# Patient Record
Sex: FEMALE | Race: WHITE | NOT HISPANIC OR LATINO | Employment: OTHER | URBAN - METROPOLITAN AREA
[De-identification: names, ages, dates, MRNs, and addresses within clinical notes are randomized per-mention and may not be internally consistent; named-entity substitution may affect disease eponyms.]

---

## 2017-01-31 ENCOUNTER — HOSPITAL ENCOUNTER (OUTPATIENT)
Dept: RADIOLOGY | Facility: HOSPITAL | Age: 52
Discharge: HOME | End: 2017-01-31
Attending: GENERAL PRACTICE
Payer: MEDICAID

## 2017-01-31 DIAGNOSIS — Z12.31 VISIT FOR SCREENING MAMMOGRAM: ICD-10-CM

## 2017-01-31 PROCEDURE — 77063 BREAST TOMOSYNTHESIS BI: CPT | Mod: 26,,, | Performed by: RADIOLOGY

## 2017-01-31 PROCEDURE — 77067 SCR MAMMO BI INCL CAD: CPT | Mod: 26,,, | Performed by: RADIOLOGY

## 2017-01-31 PROCEDURE — 77067 SCR MAMMO BI INCL CAD: CPT | Mod: TC

## 2018-01-29 DIAGNOSIS — Z12.31 VISIT FOR SCREENING MAMMOGRAM: Primary | ICD-10-CM

## 2018-08-20 DIAGNOSIS — Z12.39 SCREENING BREAST EXAMINATION: Primary | ICD-10-CM

## 2018-08-23 ENCOUNTER — HOSPITAL ENCOUNTER (OUTPATIENT)
Dept: RADIOLOGY | Facility: HOSPITAL | Age: 53
Discharge: HOME | End: 2018-08-23
Attending: NURSE PRACTITIONER
Payer: MEDICAID

## 2018-08-23 VITALS — BODY MASS INDEX: 38.65 KG/M2 | WEIGHT: 255 LBS | HEIGHT: 68 IN

## 2018-08-23 DIAGNOSIS — Z12.39 SCREENING BREAST EXAMINATION: ICD-10-CM

## 2018-08-23 PROCEDURE — 77067 SCR MAMMO BI INCL CAD: CPT | Mod: 26,,, | Performed by: RADIOLOGY

## 2018-08-23 PROCEDURE — 77063 BREAST TOMOSYNTHESIS BI: CPT | Mod: 26,,, | Performed by: RADIOLOGY

## 2018-08-23 PROCEDURE — 77067 SCR MAMMO BI INCL CAD: CPT | Mod: TC

## 2018-09-12 DIAGNOSIS — M25.572 ACUTE LEFT ANKLE PAIN: Primary | ICD-10-CM

## 2018-09-12 DIAGNOSIS — S93.422A SPRAIN OF DELTOID LIGAMENT OF LEFT ANKLE, INITIAL ENCOUNTER: ICD-10-CM

## 2018-09-12 DIAGNOSIS — S93.412A SPRAIN OF CALCANEOFIBULAR LIGAMENT OF LEFT ANKLE, INITIAL ENCOUNTER: ICD-10-CM

## 2018-09-28 ENCOUNTER — CLINICAL SUPPORT (OUTPATIENT)
Dept: REHABILITATION | Facility: HOSPITAL | Age: 53
End: 2018-09-28
Attending: ORTHOPAEDIC SURGERY
Payer: MEDICAID

## 2018-09-28 DIAGNOSIS — R53.1 DECREASED STRENGTH: ICD-10-CM

## 2018-09-28 DIAGNOSIS — M25.572 CHRONIC PAIN OF LEFT ANKLE: ICD-10-CM

## 2018-09-28 DIAGNOSIS — G89.29 CHRONIC PAIN OF LEFT ANKLE: ICD-10-CM

## 2018-09-28 DIAGNOSIS — M25.673 DECREASED ROM OF ANKLE: ICD-10-CM

## 2018-09-28 PROCEDURE — 97161 PT EVAL LOW COMPLEX 20 MIN: CPT | Mod: PN

## 2018-09-28 PROCEDURE — 97110 THERAPEUTIC EXERCISES: CPT | Mod: PN

## 2018-09-28 NOTE — PROGRESS NOTES
See full Physical Therapy Evaluation in POC    Precautions: Previous injury to left ankle    Evaluation Date: 9/28/2018  Visit # authorized: 20  Authorization period: 12/31/2018    TREATMENT:  Eduarda MCCULLOUGH received therapeutic exercises to develop strength and endurance, flexibility for 15 minutes including:   Towel curls 2 minutes  Marbles 2 x (eversion bias)  Seated calf stretch 3 times 20 seconds  Ankle AROM EV/IN   DF c yellow TB    Pt. Received cold pack x 00 min. To left ankle. Following treatment.    Prognosis: Excellent    Medical necessity is demonstrated by the following IMPAIRMENTS/PROBLEM LIST:     1) Increase in pain level limiting function   2) Decreased ROM of left ankle   3) Decreased strength   4) Decreased functional ability    5)Lack of HEP    Anticipated Barriers for physical therapy: none    GOALS: Short Term Goals:  6 weeks  1. Patient will report decreased in left ankle pain  <   / =  2  /10  to increase tolerance for daily activities.   2. Patient will be able to report an 40% improvement in ability to engage in hobbies.   3. Patient will be able to demonstrate an increased MMT for plantar flexion 3+/5  to increase tolerance for ADL and work activities.  4. Patient will be able to tolerate HEP to improve ROM and independence with ADL's    Long Term Goals: 12 weeks  1. Patient will report decreased in left ankle pain  <   / =  0 /10  to increase tolerance for daily activities.   2. Patient will be able to report an 40% improvement in ability to engage in hobbies.   3. Patient will be able to demonstrate an increased MMT for plantar flexion 3+/5  to increase tolerance for ADL and work activities.  4. Patient will be able to report at CJ level (20-40% impaired) on Modified FIM score for mobility to demonstrate increase in LE function and mobility in home and community environment.   5. Patient will be able to demonstrate an increase of 10 degrees of overall left ankle motion to improve mobility  and function.  5. Patient will be able to demonstrate independence with HEP to improve ROM and independence with ADL's      Plan     Cont PT 1-3 times a week for 12 weeks during the certification period (9/28/2018 - 12/28/2018) PN Due: (10/28/2018)

## 2018-09-28 NOTE — PLAN OF CARE
Physical Therapy Evaluation    Name: Eduarda Casanova  Clinic Number: [id number]    Diagnosis:   Encounter Diagnoses   Name Primary?    Decreased ROM of ankle     Decreased strength     Chronic pain of left ankle      Physician: Blanca Kennedy MD  Treatment Orders: PT Eval and Treat    No past medical history on file.  No current outpatient medications on file.     No current facility-administered medications for this visit.      Review of patient's allergies indicates:  Allergies not on file  Precautions: Previous injury to left ankle    Evaluation Date: 9/28/2018  Visit # authorized: 20  Authorization period: 12/31/2018      Subjective     Eduarda MCCULLOUGH is a 53 y.o. female that presents to [facility] outpatient clinic secondary to left ankle pain. Leg fell asleep and she feel over. It has been about 4 months now. Moving ankle inside hurts. Took a while for her to finally see the doctor and a while to have a therapy session scheduled. Patient indicates there is a screw placed on her left ankle and torn ligament. Patient indicates that when she was in her 20s she and trauma to her ankle and broke it.     Patient c/o: continuous  Radicular symptoms: Present on the left side   Onset:: insidious/sudden/gradual   Pain Scale: Rates pain on a scale of 0-10 to be 3 at worst; 1 currently; 0 at best .  Aggravating factors: Unless she steps wrong, then it can be a spike   Relieving factors: Support sock   Previous treatment: Ankle therapy following initial injury   Imaging: none present in chart for current episode   Past surgical history: Fracture of left ankle 90s, RTC repair, two sinus surgeries, wisdom teeth removal   Functional deficits: Walking fast, trying to go upstairs, doing anything without being gentle with my ankle, can't fool with her boat currently, stairs at [employer] bother her, caring for her mother in  getting her up on a step is difficult     Prior level of function: IND prior to four months ago    Occupation: Drive   Environment: 1 story home with 1 steps to enter, I am aware that when I step down I keep ankle stiff and compensate with other leg   No cultural or spiritual barriers identified to treatment or learning.  Patient's goals: I am just trying not to lose mobility       Objective     Observation: No gait deviations noted, presents with fallen arch without the presence of navicular drop bilaterally, lateral joint swelling present        Ankle Range of Motion:   Ankle Right Left   Dorsiflexion 20 -6/0   Plantarflexion 60 45   Inversion 50 30   Eversion 20 0      Strength:    Right Ankle   Left Ankle   Dorsiflexion: 5/5 Dorsiflexion: 5/5   Plantarflexion:  5/5 Plantarflexion: 4-/5   Inversion: 5/5 Inversion: 4-/5   Eversion: 5/5 Eversion: 4-/5        Right LE  Left LE   Hip flexion: 5/5 Hip flexion: 5/5   Hip extension:  5/5 Hip extension: 4+/5   Hip abduction: 5/5 Hip abduction: 5/5   Hip adduction: 5/5 Hip adduction 5/5   Knee extension: 5/5 Knee extension: 5/5   Knee flexion: 5/5 Knee flexion: 5/5     Special Tests:   Right Left   Posterior Drawer Test Negative Negative   Anterior Drawer Test Negative Negative    Squeeze test Negative  Negative   Parks test Negative Negative    Windlass test Negative Negative        Gait: No deviation in gait pattern     Joint Mobility: Decreased mobility of left ankle, previous trauma     Palpation: Sensitivity present lateral aspect of ankle inferior to malleolus     Flexibility: Patient presents with limitations present     Right ankle: 51 cm   Right ankle: 51 cm     Functional Limitations Reports - G Codes  Category: Mobility   Intake 46% Current Status CK -   Predicted 37% Goal Status+ CJ -      PT Evaluation Completed? Yes  Discussed Plan of Care with patient: Yes    TREATMENT:  Eduarda MCCULLOUGH received therapeutic exercises to develop strength and endurance, flexibility for 15 minutes including:     Pt. Received cold pack x 00 min. To left ankle.  Following treatment.    HEP provided: See patient instructions   Instructed patient regarding: Proper technique with all exercises. Pt demo good understanding of the education provided. Eduarda MCCULLOUGH demonstrated good return demonstration of activities.        Assessment     This is a 53 y.o. female referred to outpatient physical therapy and presents with a medical diagnosis of Acute left ankle pain, Sprain of calcaneofibular ligament of left ankle, initial encounter, Sprain of deltoid ligament of left ankle, initial encounter and demonstrates limitations as described in the problem list. Patient will benefit from physcial therapy services in order to maximize pain free functional independence. The following goals were discussed with the patient and patient is in agreement with them as to be addressed in the treatment plan. Patient was given a HEP consisting of exercises listed above. Patient verbally understood the instructions as they were given and demonstrated proper form and technique during therapy. Patient was advised to perform these exercises free of pain, and to stop performing them if pain occurs. Patient would benefit from skilled PT to address above stated problems, as well as, achieve pt goals within a timely manner. Patient has set realistic goals and has verbalized good understanding and agreement with reported diagnosis, prognosis and treatment. Patient demonstrates no additional cultural, spiritual or educational need and currently has no barriers to learning.      History  Co-morbidities and personal factors that may impact the plan of care Examination  Body Structures and Functions, activity limitations and participation restrictions that may impact the plan of care Clinical Presentation   Decision Making/ Complexity Score   Co-morbidities:   Past trauma     Personal Factors:   Age 53  Occupation:   Lifestyle: Sedentary   Attitudes: Pleasant    Body Regions: LE    Body Systems:  Musculoskeletal (symmetry, ROM, strength, flexibility, joint mobility), Neuromuscular (coordination, posture, balance, gait, transfers, motor control/learning), Cardiovascular (endurance)    Activity limitations: Decreased ability to engage in standing activities     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 Patient will report decreased in left ankle pain  <   / =  2  /10  to increase tolerance for daily activities.   2. Patient will be able to report an 40% improvement in ability to engage in hobbies.   3. Patient will be able to demonstrate an increased MMT for plantar flexion 3+/5  to increase tolerance for ADL and work activities.  4. Patient will be able to tolerate HEP to improve ROM and independence with ADL's    Long Term Goals: 12 weeks  1. Patient will report decreased in left ankle pain  <   / =  0 /10  to increase tolerance for daily activities.   2. Patient will be able to report an 40% improvement in ability to engage in hobbies.   3. Patient will be able to demonstrate an increased MMT for plantar flexion 3+/5  to increase tolerance for ADL and work activities.  4. Patient will be able to report at CJ level (20-40% impaired) on Modified FIM score for mobility to demonstrate increase in LE function and mobility in home and community environment.   5. Patient will be able to demonstrate an increase of 10 degrees of overall left ankle motion to improve  mobility and function.  5. Patient will be able to demonstrate independence with HEP to improve ROM and independence with ADL's      Plan     Patient will be treated by physical therapy 1-3 times a week for 12 weeks for Electrical Stimulation PRN, Iontophoresis (with dexamethasone PRN), Manual Therapy, Moist Heat/ Ice, Neuromuscular Re-ed, Patient Education, Therapeutic Activites, Therapeutic Exercise and Other therapeutic taping, dry needling, aquatic therapy to achieve established goals. Eduarda MCCULLOUGH may at times be seen by a PTA as part of the Rehab Team.      Cont PT 1-3 times a week for 12 weeks during the certification period (9/28/2018 - 12/28/2018) PN Due: (10/28/2018)       I certify the need for these services furnished under this plan of treatment and while under my care.______________________________ Physician/Referring Practitioner  Date of Signature      Elena Zarate, PT  9/28/2018

## 2018-10-04 ENCOUNTER — CLINICAL SUPPORT (OUTPATIENT)
Dept: REHABILITATION | Facility: HOSPITAL | Age: 53
End: 2018-10-04
Attending: ORTHOPAEDIC SURGERY
Payer: MEDICAID

## 2018-10-04 DIAGNOSIS — G89.29 CHRONIC PAIN OF LEFT ANKLE: ICD-10-CM

## 2018-10-04 DIAGNOSIS — R53.1 DECREASED STRENGTH: ICD-10-CM

## 2018-10-04 DIAGNOSIS — M25.672 DECREASED RANGE OF MOTION OF LEFT ANKLE: ICD-10-CM

## 2018-10-04 DIAGNOSIS — M25.572 CHRONIC PAIN OF LEFT ANKLE: ICD-10-CM

## 2018-10-04 PROCEDURE — 97140 MANUAL THERAPY 1/> REGIONS: CPT | Mod: PN

## 2018-10-04 PROCEDURE — 97110 THERAPEUTIC EXERCISES: CPT | Mod: PN

## 2018-10-04 NOTE — PROGRESS NOTES
"  Physical Therapy Daily Treatment Note     Name: Eduarda Casanova  Clinic Number: [id number]    Therapy Diagnosis:   Encounter Diagnoses   Name Primary?    Decreased range of motion of left ankle     Decreased strength     Chronic pain of left ankle      Physician: Blanca Kennedy MD    Visit Date: 10/4/2018  Physician Orders: Evaluate and Treat  Medical Diagnosis: sprain of calcaneofibular ligament left angle; sprain of deltoid ligament left ankle   Evaluation Date: 9/28/18  Authorization Period Expiration: 12/31/18  Visit #/Visits authorized: 2/20     Time In: 10:00AM  Time Out: 10:58AM  Total Billable Time: 58 minutes    Precautions: Standard    Subjective     Pt reports: Patient reports that she is stretching at home as prescribed by therapist to "the severity I can tolerate". Pt reports "soreness" that remains afterwards. Patient states that she feels like sometimes tomas she changes directions she feels like her ankle may "buckle:     She was compliant with home exercise program.  Response to previous treatment: good   Functional change: minimal     Pain: 2/10  Location: left ankle    Objective     Maria Esther received therapeutic exercises to develop ROM and flexibility for 15 minutes including:   - gastroc stretch with strap 30" holds x 4 reps    - contract/relax 10" hold x 10 reps each into PF/DF   - assisted stretching 20" hold x 4 reps each INV/EVR   - gastroc/solues stretch on wedge     Maria Esther received the following manual therapy techniques: Joint mobilizations were applied to the the left ankle for 20 minutes, including   - AP mobilization grade III to improve motion    Tissue mobilization performed for 20 minutes     -STM/MFR and graston applied to tissues in posterior ankle for improved DF/PF    Maria Esther participated in neuromuscular re-education activities to improve:  for  minutes. The following activities were included:      Maria Esther participated in dynamic functional therapeutic activities to improve " "functional performance for   minutes, including:      Maria Esther participated in gait training to improve functional mobility and safety for   minutes, including:      Maria Esther received cold pack for 10 minutes to to decrease circulation, pain, and swelling post treatment           Home Exercises Provided and Patient Education Provided     Education provided:   - stretching, and DOMS     Written Home Exercises Provided: Patient instructed to cont prior HEP.  Exercises were reviewed and Maria Esther was able to demonstrate them prior to the end of the session.  Maria Esther demonstrated good  understanding of the education provided.       Assessment     Patient treatment focused on improvement on joint mobility and tissue mobility in joint and ankle. Patient tolerated well with reports of feeling "improved" motion post treatment.     Maria Esther is progressing well towards her goals.   Pt prognosis is Good.     Pt will continue to benefit from skilled outpatient physical therapy to address the deficits listed in the problem list box on initial evaluation, provide pt/family education and to maximize pt's level of independence in the home and community environment.     Pt's spiritual, cultural and educational needs considered and pt agreeable to plan of care and goals.    Anticipated barriers to physical therapy: none       Plan     Patient will benefit from continuation of manual therapy techniques to improve joint mobility and tissue mobility in posterior chain of left limb for improved mobility patterns. Patient will benefit from progression to strengthening program when tolerated.     Charles Mckoy, PT    "

## 2018-10-09 ENCOUNTER — CLINICAL SUPPORT (OUTPATIENT)
Dept: REHABILITATION | Facility: HOSPITAL | Age: 53
End: 2018-10-09
Attending: ORTHOPAEDIC SURGERY
Payer: MEDICAID

## 2018-10-09 DIAGNOSIS — M25.672 DECREASED RANGE OF MOTION OF LEFT ANKLE: ICD-10-CM

## 2018-10-09 DIAGNOSIS — M25.572 CHRONIC PAIN OF LEFT ANKLE: ICD-10-CM

## 2018-10-09 DIAGNOSIS — R53.1 DECREASED STRENGTH: ICD-10-CM

## 2018-10-09 DIAGNOSIS — G89.29 CHRONIC PAIN OF LEFT ANKLE: ICD-10-CM

## 2018-10-09 PROCEDURE — 97140 MANUAL THERAPY 1/> REGIONS: CPT | Mod: PN

## 2018-10-09 PROCEDURE — 97110 THERAPEUTIC EXERCISES: CPT | Mod: PN

## 2018-10-10 NOTE — PROGRESS NOTES
"  Physical Therapy Daily Treatment Note     Name: Eduarda Casanova  Clinic Number: [id number]    Therapy Diagnosis:   Encounter Diagnoses   Name Primary?    Decreased range of motion of left ankle     Decreased strength     Chronic pain of left ankle      Physician: Blanca Kennedy MD    Visit Date: 10/9/2018  Physician Orders: Evaluate and Treat  Medical Diagnosis: sprain of calcaneofibular ligament left angle; sprain of deltoid ligament left ankle   Evaluation Date: 9/28/18  Authorization Period Expiration: 12/31/18  Visit #/Visits authorized: 2/20     Time In: 11:00AM  Time Out: 11:55AM  Total Billable Time: 55 minutes    Precautions: Standard    Subjective     Pt reports: Patient reports that she is stretching at home as prescribed and feels like she is gaining motion. Patient reports slight "soreness" today but she relates it to soreness.     She was compliant with home exercise program.  Response to previous treatment: good   Functional change: minimal     Pain: 4/10  Location: left ankle    Objective     Maria Esther received therapeutic exercises to develop ROM and flexibility for 15 minutes including:   - gastroc stretch with strap 30" holds x 4 reps    - contract/relax 10" hold x 10 reps each into PF/DF   - assisted stretching 20" hold x 4 reps each INV/EVR   - gastroc/solues stretch on wedge    -  resisted (manually) with progressing resistance ankle 4 way, 20 reps in each direction     Maria Esther received the following manual therapy techniques: Joint mobilizations were applied to the the left ankle for 20 minutes, including   - AP mobilization grade III to improve motion    Tissue mobilization performed for 20 minutes     -STM/MFR and graston applied to tissues in posterior ankle for improved DF/PF  -mobilization on wedge, AP, grade III  -graston to ant tib with manual therapy to gastroc     Maria Esther participated in neuromuscular re-education activities to improve:  for  minutes. The following activities were " "included:      Maria Esther participated in dynamic functional therapeutic activities to improve functional performance for   minutes, including:      Maria Esther participated in gait training to improve functional mobility and safety for   minutes, including:      Maria Esther received cold pack for 10 minutes to to decrease circulation, pain, and swelling post treatment           Home Exercises Provided and Patient Education Provided     Education provided:   - stretching, and DOMS     Written Home Exercises Provided: Patient instructed to cont prior HEP.  Exercises were reviewed and Maria Esther was able to demonstrate them prior to the end of the session.  Maria Esther demonstrated good  understanding of the education provided. CP applied post treatment to moderate DOMS potential.       Assessment     Patient treatment focused on improvement on joint mobility and tissue mobility in joint and ankle. Patient tolerated well with reports of feeling "improved" motion post treatment.     Maria Esther is progressing well towards her goals.   Pt prognosis is Good.     Pt will continue to benefit from skilled outpatient physical therapy to address the deficits listed in the problem list box on initial evaluation, provide pt/family education and to maximize pt's level of independence in the home and community environment.     Pt's spiritual, cultural and educational needs considered and pt agreeable to plan of care and goals.    Anticipated barriers to physical therapy: none       Plan     Patient will benefit from continuation of manual therapy techniques to improve joint mobility and tissue mobility in posterior chain of left limb for improved mobility patterns. Patient will benefit from progression to strengthening program when tolerated. Consider cupping. Increased ankle pain noted with soleus stretch - deferred to 50% stretch to avoid ant ankle pain.     Charles Mckoy, PT  "

## 2018-10-11 ENCOUNTER — CLINICAL SUPPORT (OUTPATIENT)
Dept: REHABILITATION | Facility: HOSPITAL | Age: 53
End: 2018-10-11
Attending: ORTHOPAEDIC SURGERY
Payer: MEDICAID

## 2018-10-11 DIAGNOSIS — R53.1 DECREASED STRENGTH: ICD-10-CM

## 2018-10-11 DIAGNOSIS — M25.672 DECREASED RANGE OF MOTION OF LEFT ANKLE: ICD-10-CM

## 2018-10-11 DIAGNOSIS — M25.572 CHRONIC PAIN OF LEFT ANKLE: ICD-10-CM

## 2018-10-11 DIAGNOSIS — G89.29 CHRONIC PAIN OF LEFT ANKLE: ICD-10-CM

## 2018-10-11 PROCEDURE — 97110 THERAPEUTIC EXERCISES: CPT | Mod: PN

## 2018-10-11 PROCEDURE — 97140 MANUAL THERAPY 1/> REGIONS: CPT | Mod: PN

## 2018-10-11 NOTE — PROGRESS NOTES
"  Physical Therapy Daily Treatment Note     Name: Eduarda Casanova  Clinic Number: [id number]    Therapy Diagnosis:   Encounter Diagnoses   Name Primary?    Decreased range of motion of left ankle     Decreased strength     Chronic pain of left ankle      Physician: Blanca Kennedy MD    Visit Date: 10/11/2018  Physician Orders: Evaluate and Treat  Medical Diagnosis: sprain of calcaneofibular ligament left angle; sprain of deltoid ligament left ankle   Evaluation Date: 9/28/18  Authorization Period Expiration: 12/31/18  Visit #/Visits authorized: 2/20     Time In: 09:55AM  Time Out: 11:00AM  Total Billable Time: 55 minutes    Precautions: Standard    Subjective     Pt reports: Patient reports that she is stretching at home as prescribed and feels like she is gaining motion. Patient reports slight "soreness" today but she relates it to soreness.     She was compliant with home exercise program.  Response to previous treatment: good   Functional change: minimal     Pain: 4/10  Location: left ankle    Objective     Maria Esther received therapeutic exercises to develop ROM and flexibility for 15 minutes including:   - gastroc stretch with strap 30" holds x 4 reps    - contract/relax 10" hold x 10 reps each into PF/DF   - assisted stretching 20" hold x 4 reps each INV/EVR   - gastroc/solues stretch on wedge    - resisted (manually) with progressing resistance ankle 4 way, 20 reps in each direction   + upright bike 8'     Maria Esther received the following manual therapy techniques: Joint mobilizations were applied to the the left ankle for 20 minutes, including   - AP mobilization grade III to improve motion    Tissue mobilization performed for 20 minutes     -STM/MFR and graston applied to tissues in posterior ankle for improved DF/PF  -mobilization on wedge, AP, grade III  -graston to ant tib with manual therapy to gastroc; ant ankle above lateral malleoli   -KT applied to pull ankle laterally    Maria Esther participated in " "neuromuscular re-education activities to improve:  for  minutes. The following activities were included:      Maria Esther participated in dynamic functional therapeutic activities to improve functional performance for   minutes, including:      Maria Esther participated in gait training to improve functional mobility and safety for   minutes, including:      Maria Esther received cold pack for 10 minutes to to decrease circulation, pain, and swelling post treatment           Home Exercises Provided and Patient Education Provided     Education provided:   - stretching, and DOMS     Written Home Exercises Provided: Patient instructed to cont prior HEP.  Exercises were reviewed and Maria Esther was able to demonstrate them prior to the end of the session.  Maria Esther demonstrated good  understanding of the education provided. CP applied post treatment to moderate DOMS potential.       Assessment     Patient treatment focused on improvement on joint mobility and tissue mobility in joint and ankle. Patient tolerated well with reports of feeling "improved" motion post treatment. KT applied - patient instructed on how to remove and when to remove with allergy. Pt verbalized understanding     Maria Esther is progressing well towards her goals.   Pt prognosis is Good.     Pt will continue to benefit from skilled outpatient physical therapy to address the deficits listed in the problem list box on initial evaluation, provide pt/family education and to maximize pt's level of independence in the home and community environment.     Pt's spiritual, cultural and educational needs considered and pt agreeable to plan of care and goals.    Anticipated barriers to physical therapy: none       Plan     Patient will benefit from continuation of manual therapy techniques to improve joint mobility and tissue mobility in posterior chain of left limb for improved mobility patterns. Patient will benefit from progression to strengthening program when tolerated. Consider cupping. " Increased ankle pain noted with soleus stretch - deferred to 50% stretch to avoid ant ankle pain.   Baps board at next visit    Charles Mckoy, PT

## 2018-10-22 NOTE — PROGRESS NOTES
"  Physical Therapy Daily Treatment Note     Name: Eduarda Casanova  Clinic Number: [id number]    Therapy Diagnosis:   Encounter Diagnoses   Name Primary?    Decreased range of motion of left ankle     Decreased strength     Chronic pain of left ankle      Physician: Blanca Kennedy MD    Visit Date: 10/23/2018  Physician Orders: Evaluate and Treat  Medical Diagnosis: sprain of calcaneofibular ligament left angle; sprain of deltoid ligament left ankle   Evaluation Date: 9/28/18  Authorization Period Expiration: 12/31/18  Visit #/Visits authorized: 5/20     Time In: 0900   Time Out: 1000  Total Billable Time: 60 minutes    Precautions: Standard    Subjective     Pt reports: the side of her ankle is bothering her right now. Patient states that she does not usually have this pain present but it usually occurs on the other side. Patient says that she really is hurting a little more today.    She was compliant with home exercise program.  Response to previous treatment: good   Functional change: minimal     Pain: 4/10  Location: left ankle    Objective     Maria Esther received therapeutic exercises to develop ROM and flexibility for 30 minutes including:   - gastroc stretch with strap 30" holds x 4 reps    - contract/relax 10" hold x 10 reps each into PF/DF   - assisted stretching 20" hold x 4 reps each INV/EVR   - gastroc/solues stretch on wedge    - resisted (manually) with progressing resistance ankle 4 way, 20 reps in each direction   upright bike 8'   +SL Eversion 1# 30 reps   +SL inversion 1# 30 reps   +1 leg balance on incline 2 times 30 seconds  +BAPs 20 reps CW/CCW     Maria Esther received the following manual therapy techniques: Joint mobilizations were applied to the the left ankle for 20 minutes, including   - AP mobilization grade III to improve motion    Tissue mobilization performed for 20 minutes     -STM/MFR and graston applied to tissues in posterior ankle for improved DF/PF  -mobilization on wedge, AP, grade " III  -manual to ant tib with manual therapy to gastroc; ant ankle above lateral malleoli   -KT applied to pull ankle laterally    Maria Esther participated in neuromuscular re-education activities to improve:  for  minutes. The following activities were included:        Maria Esther received cold pack for 10 minutes to to decrease circulation, pain, and swelling post treatment           Home Exercises Provided and Patient Education Provided     Education provided:   - stretching, and DOMS     Written Home Exercises Provided: Patient instructed to cont prior HEP.  Exercises were reviewed and Maria Esther was able to demonstrate them prior to the end of the session.  Maria Esther demonstrated good  understanding of the education provided. CP applied post treatment to moderate DOMS potential.       Assessment     Patient tolerated treatment well today. Patient was able to engage in balance exercises during today's treatment to improve proprioception and standing tolerance. Patient was able to engage in resistive AROM of the ankle against gravity. Patient had proper response to therapeutic exercises performed in clinic today. Patient presents with muscular restrictions present in calf muscle evident with manual therapy.     Maria Esther is progressing well towards her goals.   Pt prognosis is Good.     Pt will continue to benefit from skilled outpatient physical therapy to address the deficits listed in the problem list box on initial evaluation, provide pt/family education and to maximize pt's level of independence in the home and community environment.     Pt's spiritual, cultural and educational needs considered and pt agreeable to plan of care and goals.    Anticipated barriers to physical therapy: none       Plan     Patient will benefit from continuation of manual therapy techniques to improve joint mobility and tissue mobility in posterior chain of left limb for improved mobility patterns. Patient will benefit from progression to strengthening  program when tolerated. Consider cupping. Increased ankle pain noted with soleus stretch - deferred to 50% stretch to avoid ant ankle pain.   Baps board at next visit    Elena Zarate, PT

## 2018-10-23 ENCOUNTER — CLINICAL SUPPORT (OUTPATIENT)
Dept: REHABILITATION | Facility: HOSPITAL | Age: 53
End: 2018-10-23
Attending: ORTHOPAEDIC SURGERY
Payer: MEDICAID

## 2018-10-23 DIAGNOSIS — R53.1 DECREASED STRENGTH: ICD-10-CM

## 2018-10-23 DIAGNOSIS — M25.572 CHRONIC PAIN OF LEFT ANKLE: ICD-10-CM

## 2018-10-23 DIAGNOSIS — M25.672 DECREASED RANGE OF MOTION OF LEFT ANKLE: ICD-10-CM

## 2018-10-23 DIAGNOSIS — G89.29 CHRONIC PAIN OF LEFT ANKLE: ICD-10-CM

## 2018-10-23 PROCEDURE — 97110 THERAPEUTIC EXERCISES: CPT | Mod: PN

## 2018-10-30 ENCOUNTER — CLINICAL SUPPORT (OUTPATIENT)
Dept: REHABILITATION | Facility: HOSPITAL | Age: 53
End: 2018-10-30
Attending: ORTHOPAEDIC SURGERY
Payer: MEDICAID

## 2018-10-30 DIAGNOSIS — R53.1 DECREASED STRENGTH: ICD-10-CM

## 2018-10-30 DIAGNOSIS — G89.29 CHRONIC PAIN OF LEFT ANKLE: ICD-10-CM

## 2018-10-30 DIAGNOSIS — M25.572 CHRONIC PAIN OF LEFT ANKLE: ICD-10-CM

## 2018-10-30 DIAGNOSIS — M25.672 DECREASED RANGE OF MOTION OF LEFT ANKLE: ICD-10-CM

## 2018-10-30 PROCEDURE — 97110 THERAPEUTIC EXERCISES: CPT | Mod: PN

## 2018-10-30 NOTE — PROGRESS NOTES
"  Physical Therapy Daily Treatment Note     Name: Eduarda Casanova  Clinic Number: [id number]    Therapy Diagnosis:   Encounter Diagnoses   Name Primary?    Decreased range of motion of left ankle     Decreased strength     Chronic pain of left ankle      Physician: Blanca Kennedy MD    Visit Date: 10/30/2018  Physician Orders: Evaluate and Treat  Medical Diagnosis: sprain of calcaneofibular ligament left angle; sprain of deltoid ligament left ankle   Evaluation Date: 9/28/18  Authorization Period Expiration: 12/31/18  Visit #/Visits authorized: 5/20     Time In: 0900   Time Out: 1000  Total Billable Time: 60 minutes    Precautions: Standard    Subjective     Pt reports: the right side of the ankle is bothering today.     She was compliant with home exercise program.  Response to previous treatment: good   Functional change: minimal     Pain: 4/10  Location: left ankle    Objective     Maria Esther received therapeutic exercises to develop ROM and flexibility for 30 minutes including:   - gastroc stretch with strap 30" holds x 4 reps    - contract/relax 10" hold x 10 reps each into PF/DF   - assisted stretching 20" hold x 4 reps each INV/EVR   - gastroc/solues stretch on wedge 3 x 30 sec   - resisted (manually) with progressing resistance ankle 4 way, 20 reps in each direction   upright bike 8'   SL Eversion 1# 30 reps   SL inversion 1# 30 reps   1 leg balance on incline 2 times 30 seconds  +Seated BAPs 20 reps CW/CCW   +Shuttle 2 leg press 4 bands 3 x 12 reps  +Shuttle 1 leg press 2 bands 3 x 10 reps   +Shuttle calf raises 2 leg 2 x 10 reps   Seated calf raises 3 x 20 reps    Posterior tib calf raises 20 reps     Maria Esther received the following manual therapy techniques: Joint mobilizations were applied to the the left ankle for 10 minutes, including   - AP mobilization grade III to improve motion    Tissue mobilization performed for 20 minutes     -STM/MFR and graston applied to tissues in posterior ankle for improved " DF/PF  -mobilization on wedge, AP, grade III  -manual to ant tib with manual therapy to gastroc; ant ankle above lateral malleoli   -KT applied to pull ankle laterally    Maria Esther participated in neuromuscular re-education activities to improve:  for  minutes. The following activities were included:    Maria Esther received cold pack for 10 minutes to to decrease circulation, pain, and swelling post treatment       Home Exercises Provided and Patient Education Provided     Education provided:   - stretching, and DOMS     Written Home Exercises Provided: Patient instructed to cont prior HEP.  Exercises were reviewed and Maria Eshter was able to demonstrate them prior to the end of the session.  Maria Esther demonstrated good  understanding of the education provided. CP applied post treatment to moderate DOMS potential.       Assessment     Patient tolerated treatment well today. Patient engaged in activities seated today to reduce presence of discomforts present in right ankle. Patient did well with manual care. Patient presents with muscular restrictions present in calf muscle evident with manual therapy.     Maria Esther is progressing well towards her goals.   Pt prognosis is Good.     Pt will continue to benefit from skilled outpatient physical therapy to address the deficits listed in the problem list box on initial evaluation, provide pt/family education and to maximize pt's level of independence in the home and community environment.     Pt's spiritual, cultural and educational needs considered and pt agreeable to plan of care and goals.    Anticipated barriers to physical therapy: none       Plan     Patient will benefit from continuation of manual therapy techniques to improve joint mobility and tissue mobility in posterior chain of left limb for improved mobility patterns. Patient will benefit from progression to strengthening program when tolerated. Consider cupping. Increased ankle pain noted with soleus stretch - deferred to 50%  stretch to avoid ant ankle pain.   Baps board at next visit    Elena Zarate, PT

## 2018-11-01 ENCOUNTER — CLINICAL SUPPORT (OUTPATIENT)
Dept: REHABILITATION | Facility: HOSPITAL | Age: 53
End: 2018-11-01
Attending: ORTHOPAEDIC SURGERY
Payer: MEDICAID

## 2018-11-01 DIAGNOSIS — R53.1 DECREASED STRENGTH: ICD-10-CM

## 2018-11-01 DIAGNOSIS — S93.422A SPRAIN OF DELTOID LIGAMENT OF LEFT ANKLE, INITIAL ENCOUNTER: ICD-10-CM

## 2018-11-01 DIAGNOSIS — M25.572 CHRONIC PAIN OF LEFT ANKLE: ICD-10-CM

## 2018-11-01 DIAGNOSIS — G89.29 CHRONIC PAIN OF LEFT ANKLE: ICD-10-CM

## 2018-11-01 DIAGNOSIS — S93.412A SPRAIN OF CALCANEOFIBULAR LIGAMENT OF LEFT ANKLE, INITIAL ENCOUNTER: ICD-10-CM

## 2018-11-01 DIAGNOSIS — M25.672 DECREASED RANGE OF MOTION OF LEFT ANKLE: Primary | ICD-10-CM

## 2018-11-01 DIAGNOSIS — M25.572 ACUTE LEFT ANKLE PAIN: ICD-10-CM

## 2018-11-01 PROCEDURE — 97110 THERAPEUTIC EXERCISES: CPT | Mod: PN

## 2018-11-01 NOTE — PROGRESS NOTES
"  Physical Therapy Daily Treatment Note     Name: Eduarda Casanova  Clinic Number: [id number]    Therapy Diagnosis:   Encounter Diagnoses   Name Primary?    Decreased range of motion of left ankle Yes    Decreased strength     Chronic pain of left ankle     Acute left ankle pain     Sprain of calcaneofibular ligament of left ankle, initial encounter     Sprain of deltoid ligament of left ankle, initial encounter      Physician: Blanca Kennedy MD    Visit Date: 11/1/2018  Physician Orders: Evaluate and Treat  Medical Diagnosis: sprain of calcaneofibular ligament left angle; sprain of deltoid ligament left ankle   Evaluation Date: 9/28/18  Authorization Period Expiration: 12/31/18  Visit #/Visits authorized: 5/20     Time In: 0915   Time Out:   Total Billable Time: 60 minutes    Precautions: Standard    Subjective     Pt reports: the right side of of the left ankle and the joint is giving her pain today.  Pt states that she is also feeling the back of her ankle today.  Pt states that she felt it on Tuesday then it went away and came back.     She was compliant with home exercise program.  Response to previous treatment: good   Functional change: minimal     Pain: 4/10  Location: left ankle    Objective     Maria Esther received therapeutic exercises to develop ROM and flexibility for 40 minutes including:     - gastroc stretch with strap 30" holds x 4 reps    - contract/relax 10" hold x 10 reps each into PF/DF   - assisted stretching 20" hold x 4 reps each INV/EVR   - gastroc/solues stretch on wedge 3 x 30 sec   - resisted (manually) with progressing resistance ankle 4 way, 20 reps in each direction   upright bike 8'   SL Eversion 1# 30 reps   SL inversion 1# 30 reps   1 leg balance on incline 2 times 30 seconds  +Seated BAPs 20 reps CW/CCW   +Shuttle 2 leg press 4 bands 3 x 12 reps  +Shuttle 1 leg press 2 bands 3 x 10 reps   +Shuttle calf raises 2 leg 2 x 10 reps   Seated calf raises 3 x 20 reps    Posterior tib " calf raises 20 reps     Maria Esther received the following manual therapy techniques: Joint mobilizations were applied to the the left ankle for 10 minutes, including   - AP mobilization grade III to improve motion    -STM/MFR and graston applied to tissues in posterior ankle for improved DF/PF  -mobilization on wedge, AP, grade III  -manual to ant tib with manual therapy to gastroc; ant ankle above lateral malleoli   -KT applied to pull ankle laterally    Maria Esther participated in neuromuscular re-education activities to improve:  for  minutes. The following activities were included:    Maria Esther received cold pack for 10 minutes to to decrease circulation, pain, and swelling post treatment       Home Exercises Provided and Patient Education Provided     Education provided: complinace with stretching, use and effects of Tubigrip to assist in decreasing swelling.     Written Home Exercises Provided: Patient instructed to cont prior HEP.  Exercises were reviewed and Maria Esther was able to demonstrate them prior to the end of the session.  Maria Esther demonstrated good  understanding of the education provided.       Assessment     Patient tolerated treatment fairly well today.  Pt with reports of pain with soleus stretch die to decreased tissue flexibility.  Patient with nodules palpated throughout the gastric soleus complex.  Pt with good response to manual care with a decreased in stiffness and moderately improved tissue mobility achieved post care.     Maria Esther is progressing well towards her goals.   Pt prognosis is Good.     Pt will continue to benefit from skilled outpatient physical therapy to address the deficits listed in the problem list box on initial evaluation, provide pt/family education and to maximize pt's level of independence in the home and community environment.     Pt's spiritual, cultural and educational needs considered and pt agreeable to plan of care and goals.    Anticipated barriers to physical therapy: none       Plan      Cont with current POC.       Stephanie Nagy, PTA

## 2018-11-06 ENCOUNTER — CLINICAL SUPPORT (OUTPATIENT)
Dept: REHABILITATION | Facility: HOSPITAL | Age: 53
End: 2018-11-06
Attending: ORTHOPAEDIC SURGERY
Payer: MEDICAID

## 2018-11-06 DIAGNOSIS — G89.29 CHRONIC PAIN OF LEFT ANKLE: ICD-10-CM

## 2018-11-06 DIAGNOSIS — M25.672 DECREASED RANGE OF MOTION OF LEFT ANKLE: ICD-10-CM

## 2018-11-06 DIAGNOSIS — R53.1 DECREASED STRENGTH: ICD-10-CM

## 2018-11-06 DIAGNOSIS — M25.572 CHRONIC PAIN OF LEFT ANKLE: ICD-10-CM

## 2018-11-06 PROCEDURE — 97110 THERAPEUTIC EXERCISES: CPT | Mod: PN

## 2018-11-06 NOTE — PROGRESS NOTES
"  Physical Therapy Daily Treatment Note     Name: Eduarda Casanova  Clinic Number: [id number]    Therapy Diagnosis:   Encounter Diagnoses   Name Primary?    Decreased range of motion of left ankle     Decreased strength     Chronic pain of left ankle      Physician: Blanca Kennedy MD    Visit Date: 11/6/2018  Physician Orders: Evaluate and Treat  Medical Diagnosis: sprain of calcaneofibular ligament left angle; sprain of deltoid ligament left ankle   Evaluation Date: 9/28/18  Authorization Period Expiration: 12/31/18  Visit #/Visits authorized: 5/20     Time In: 0916   Time Out: 1016  Total Billable Time: 60 minutes    Precautions: Standard    Subjective     Pt reports: the side of her ankle on the right is hurting her again. Patient says she was not that sore following last treatment session.     She was compliant with home exercise program.  Response to previous treatment: good   Functional change: minimal     Pain: 4/10  Location: left ankle    Objective     Maria Esther received therapeutic exercises to develop ROM and flexibility for 40 minutes including:     - gastroc stretch with strap 30" holds x 4 reps    - contract/relax 10" hold x 10 reps each into PF/DF   - assisted stretching 20" hold x 4 reps each INV/EVR   - gastroc/solues stretch on wedge 3 x 30 sec   - resisted (manually) with progressing resistance ankle 4 way, 20 reps in each direction   upright bike 8'   SL Eversion 1# 30 reps   SL inversion 1# 30 reps   1 leg balance on incline 2 times 30 seconds (black foam incline)   +Seated BAPs 20 reps CW/CCW   +Shuttle 2 leg press 4 bands 3 x 12 reps  +Shuttle 1 leg press 2 bands 3 x 10 reps   +Shuttle calf raises 2 leg 2 x 10 reps   Seated calf raises 3 x 20 reps    Posterior tib calf raises 20 reps     Maria Esther received the following manual therapy techniques: Joint mobilizations were applied to the the left ankle for 10 minutes, including   - AP mobilization grade III to improve motion    -STM/MFR and " graston applied to tissues in posterior ankle for improved DF/PF  -mobilization on wedge, AP, grade III  -manual to ant tib with manual therapy to gastroc; ant ankle above lateral malleoli   -KT applied to pull ankle laterally    Maria Esther participated in neuromuscular re-education activities to improve:  for  minutes. The following activities were included:    Maria Esther received cold pack for 10 minutes to to decrease circulation, pain, and swelling post treatment       Home Exercises Provided and Patient Education Provided     Education provided: complinace with stretching, use and effects of Tubigrip to assist in decreasing swelling.     Written Home Exercises Provided: Patient instructed to cont prior HEP.  Exercises were reviewed and Maria Esther was able to demonstrate them prior to the end of the session.  Maria Esther demonstrated good  understanding of the education provided.       Assessment     Patient tolerated treatment fairly well today. Patient did well with taping technique today placing support of her calcaneous into eversion to reduce lateral tension present in the joint. Patient will continue to benefit from activities that increase proprioception and increase ROM of the left ankle joint.     Maria Esther is progressing well towards her goals.   Pt prognosis is Good.     Pt will continue to benefit from skilled outpatient physical therapy to address the deficits listed in the problem list box on initial evaluation, provide pt/family education and to maximize pt's level of independence in the home and community environment.     Pt's spiritual, cultural and educational needs considered and pt agreeable to plan of care and goals.    Anticipated barriers to physical therapy: none       Plan     Cont with current POC.       Elena Zarate, PT

## 2018-11-06 NOTE — PROGRESS NOTES
"  Physical Therapy Daily Treatment Note     Name: Eduarda Casanova  Clinic Number: [id number]    Therapy Diagnosis:   Encounter Diagnoses   Name Primary?    Decreased range of motion of left ankle     Decreased strength     Chronic pain of left ankle      Physician: Blanca Kennedy MD    Visit Date: 11/8/2018  Physician Orders: Evaluate and Treat  Medical Diagnosis: sprain of calcaneofibular ligament left angle; sprain of deltoid ligament left ankle   Evaluation Date: 9/28/18  Authorization Period Expiration: 12/31/18  Visit #/Visits authorized: 5/20     Time In: 0920  Time Out: 1020  Total Billable Time: 60 minutes    Precautions: Standard    Subjective     Pt reports: her ankle is actually feeling a little bit better today. Patient states that the tape did not feel that great for her and now she has a sore spot on the side of her arm.     She was compliant with home exercise program.  Response to previous treatment: good   Functional change: minimal     Pain: 3/10  Location: left ankle    Objective     Maria Esther received therapeutic exercises to develop ROM and flexibility for 40 minutes including:     - gastroc stretch with strap 30" holds x 4 reps    - contract/relax 10" hold x 10 reps each into PF/DF   - assisted stretching 20" hold x 4 reps each INV/EVR   - gastroc/solues stretch on wedge 3 x 30 sec    - resisted (manually) with progressing resistance ankle 4 way, 20 reps in each direction   upright bike 8'   SL Eversion 1# 30 reps   SL inversion 1# 30 reps   1 leg balance on incline 2 times 30 seconds  +Seated BAPs 20 reps CW/CCW   Shuttle 2 leg press 4 bands 3 x 12 reps  +Shuttle 1 leg press 2 bands 3 x 15 reps c red disc  +Shuttle calf raises 2 leg 2 x 10 reps 2 bands   Shuttle Posterior tib calf raises 20 reps 2 bands     Not performed today:  Seated calf raises 3 x 20 reps      Maria Esther received the following manual therapy techniques: Joint mobilizations were applied to the the left ankle for 10 minutes, " including   - AP mobilization grade III to improve motion    -STM/MFR and graston applied to tissues in posterior ankle for improved DF/PF  -mobilization on wedge, AP, grade III  -manual to ant tib with manual therapy to gastroc; ant ankle above lateral malleoli   -KT applied to pull ankle laterally    Maria Esther participated in neuromuscular re-education activities to improve:  for  minutes. The following activities were included:    Maria Esther received cold pack for 10 minutes to to decrease circulation, pain, and swelling post treatment       Home Exercises Provided and Patient Education Provided     Education provided: complinace with stretching, use and effects of Tubigrip to assist in decreasing swelling.     Written Home Exercises Provided: Patient instructed to cont prior HEP.  Exercises were reviewed and Maria Esther was able to demonstrate them prior to the end of the session.  Maria Esther demonstrated good  understanding of the education provided.       Assessment     Patient tolerated treatment fairly well today. Patient had good response to therapy today but demonstrated increase in pain symptoms. Patient increased to about 5/10 with increase in variable surfaces. Patient did well with disc exercise on shuttle to increase muscle activation and response.   Maria Esther is progressing well towards her goals.   Pt prognosis is Good.     Pt will continue to benefit from skilled outpatient physical therapy to address the deficits listed in the problem list box on initial evaluation, provide pt/family education and to maximize pt's level of independence in the home and community environment.     Pt's spiritual, cultural and educational needs considered and pt agreeable to plan of care and goals.    Anticipated barriers to physical therapy: none       Plan     Cont with current POC.       Elena [facility], PT

## 2018-11-08 ENCOUNTER — CLINICAL SUPPORT (OUTPATIENT)
Dept: REHABILITATION | Facility: HOSPITAL | Age: 53
End: 2018-11-08
Attending: ORTHOPAEDIC SURGERY
Payer: MEDICAID

## 2018-11-08 DIAGNOSIS — M25.572 CHRONIC PAIN OF LEFT ANKLE: ICD-10-CM

## 2018-11-08 DIAGNOSIS — G89.29 CHRONIC PAIN OF LEFT ANKLE: ICD-10-CM

## 2018-11-08 DIAGNOSIS — R53.1 DECREASED STRENGTH: ICD-10-CM

## 2018-11-08 DIAGNOSIS — M25.672 DECREASED RANGE OF MOTION OF LEFT ANKLE: ICD-10-CM

## 2018-11-08 PROCEDURE — 97110 THERAPEUTIC EXERCISES: CPT | Mod: PN

## 2018-11-13 ENCOUNTER — CLINICAL SUPPORT (OUTPATIENT)
Dept: REHABILITATION | Facility: HOSPITAL | Age: 53
End: 2018-11-13
Attending: ORTHOPAEDIC SURGERY
Payer: MEDICAID

## 2018-11-13 DIAGNOSIS — R53.1 DECREASED STRENGTH: ICD-10-CM

## 2018-11-13 DIAGNOSIS — M25.672 DECREASED RANGE OF MOTION OF LEFT ANKLE: ICD-10-CM

## 2018-11-13 DIAGNOSIS — M25.572 CHRONIC PAIN OF LEFT ANKLE: ICD-10-CM

## 2018-11-13 DIAGNOSIS — G89.29 CHRONIC PAIN OF LEFT ANKLE: ICD-10-CM

## 2018-11-13 PROCEDURE — 97110 THERAPEUTIC EXERCISES: CPT | Mod: PN

## 2018-11-13 NOTE — PROGRESS NOTES
"  Physical Therapy Daily Treatment Note     Name: Eduarda Casanova  Clinic Number: [id number]    Therapy Diagnosis:   Encounter Diagnoses   Name Primary?    Decreased range of motion of left ankle     Decreased strength     Chronic pain of left ankle      Physician: Blanca Kennedy MD    Visit Date: 11/13/2018  Physician Orders: Evaluate and Treat  Medical Diagnosis: sprain of calcaneofibular ligament left angle; sprain of deltoid ligament left ankle   Evaluation Date: 9/28/18  Authorization Period Expiration: 12/31/18  Visit #/Visits authorized: 5/20     Time In: 0938  Time Out: 1040  Total Billable Time: 58 minutes    Precautions: Standard    Subjective     Pt reports: her ankle is actually feeling a little bit better today. Patient states that the tape did not feel that great for her and now she has a sore spot on the side of her arm.     She was compliant with home exercise program.  Response to previous treatment: good   Functional change: minimal     Pain: 3/10  Location: left ankle    Objective     Maria Esther received therapeutic exercises to develop ROM and flexibility for 25 minutes including:     - gastroc stretch with strap 30" holds x 4 reps    - contract/relax 10" hold x 10 reps each into PF/DF   - assisted stretching 20" hold x 4 reps each INV/EVR   - gastroc/solues stretch on wedge 3 x 30 sec    - resisted (manually) with progressing resistance ankle 4 way, 20 reps in each direction   upright bike 8'   +Ankle inversion/eversion c red TB   SL Eversion 1# 30 reps   SL inversion 1# 30 reps   1 leg balance on incline 5 times 30 seconds  +1 leg balance on Left blue foam ballerinas 5 reps    Standing blue rocker board  20 reps CW/CCW   Shuttle 2 leg press 4 bands 3 x 12 reps  Shuttle 1 leg press 2 bands 3 x 15 reps c red disc  Shuttle calf raises 2 leg 2 x 10 reps 2 bands   Shuttle Posterior tib calf raises 20 reps 2 bands        Not performed today:  Seated calf raises 3 x 20 reps      Maria Esther received the " following manual therapy techniques: Joint mobilizations were applied to the the left ankle for 00 minutes, including   - AP mobilization grade III to improve motion    -STM/MFR and graston applied to tissues in posterior ankle for improved DF/PF  -mobilization on wedge, AP, grade III  -manual to ant tib with manual therapy to gastroc; ant ankle above lateral malleoli   -KT applied to pull ankle laterally    Maria Esther participated in neuromuscular re-education activities to improve:  for  minutes. The following activities were included:    Maria Esther received cold pack for 10 minutes to to decrease circulation, pain, and swelling post treatment       Home Exercises Provided and Patient Education Provided     Education provided: complinace with stretching, use and effects of Tubigrip to assist in decreasing swelling.     Written Home Exercises Provided: Patient instructed to cont prior HEP.  Exercises were reviewed and Maria Esther was able to demonstrate them prior to the end of the session.  Maria Esther demonstrated good  understanding of the education provided.       Assessment     Patient tolerated treatment fairly well today. Patient continues to develop soreness after engaging in exercises in clinic. Patient is having appropriate therapeutic response to improving functional strength of ankle and improve muscle recruitment. Patient demonstrated greater balance with 1 leg balance on incline and tolerated progression on foam. Patient did well with disc exercise on shuttle to increase muscle activation and response.     Maria Esther is progressing well towards her goals.   Pt prognosis is Good.      Pt will continue to benefit from skilled outpatient physical therapy to address the deficits listed in the problem list box on initial evaluation, provide pt/family education and to maximize pt's level of independence in the home and community environment.     Pt's spiritual, cultural and educational needs considered and pt agreeable to plan of  care and goals.    Anticipated barriers to physical therapy: none       Plan     Cont with current POC.       Elena Zarate, PT

## 2018-11-13 NOTE — PROGRESS NOTES
"  Physical Therapy Daily Treatment Note     Name: Eduarda Casanova  Clinic Number: [id number]    Therapy Diagnosis:   Encounter Diagnoses   Name Primary?    Decreased range of motion of left ankle     Decreased strength     Chronic pain of left ankle      Physician: Blanac Kennedy MD    Visit Date: 11/13/2018  Physician Orders: Evaluate and Treat  Medical Diagnosis: sprain of calcaneofibular ligament left angle; sprain of deltoid ligament left ankle   Evaluation Date: 9/28/18  Authorization Period Expiration: 12/31/18  Visit #/Visits authorized: 5/20     Time In: 0920  Time Out: 1020  Total Billable Time: 60 minutes    Precautions: Standard    Subjective     Pt reports: her ankle is actually feeling a little bit better today. Patient states that the tape did not feel that great for her and now she has a sore spot on the side of her arm.     She was compliant with home exercise program.  Response to previous treatment: good   Functional change: minimal     Pain: 3/10  Location: left ankle    Objective     Maria Esther received therapeutic exercises to develop ROM and flexibility for 40 minutes including:     - gastroc stretch with strap 30" holds x 4 reps    - contract/relax 10" hold x 10 reps each into PF/DF   - assisted stretching 20" hold x 4 reps each INV/EVR   - gastroc/solues stretch on wedge 3 x 30 sec    - resisted (manually) with progressing resistance ankle 4 way, 20 reps in each direction   upright bike 8'   SL Eversion 1# 30 reps   SL inversion 1# 30 reps   1 leg balance on incline 2 times 30 seconds  +Seated BAPs 20 reps CW/CCW   Shuttle 2 leg press 4 bands 3 x 12 reps  +Shuttle 1 leg press 2 bands 3 x 15 reps c red disc  +Shuttle calf raises 2 leg 2 x 10 reps 2 bands   Shuttle Posterior tib calf raises 20 reps 2 bands     Not performed today:  Seated calf raises 3 x 20 reps      Maria Esther received the following manual therapy techniques: Joint mobilizations were applied to the the left ankle for 10 minutes, " including   - AP mobilization grade III to improve motion    -STM/MFR and graston applied to tissues in posterior ankle for improved DF/PF  -mobilization on wedge, AP, grade III  -manual to ant tib with manual therapy to gastroc; ant ankle above lateral malleoli   -KT applied to pull ankle laterally    Maria Esther participated in neuromuscular re-education activities to improve:  for  minutes. The following activities were included:    Maria Esther received cold pack for 10 minutes to to decrease circulation, pain, and swelling post treatment       Home Exercises Provided and Patient Education Provided     Education provided: complinace with stretching, use and effects of Tubigrip to assist in decreasing swelling.     Written Home Exercises Provided: Patient instructed to cont prior HEP.  Exercises were reviewed and Maria Esther was able to demonstrate them prior to the end of the session.  Maria Esther demonstrated good  understanding of the education provided.       Assessment     Patient tolerated treatment fairly well today. Patient had good response to therapy today but demonstrated increase in pain symptoms. Patient increased to about 5/10 with increase in variable surfaces. Patient did well with disc exercise on shuttle to increase muscle activation and response.   Maria Esther is progressing well towards her goals.   Pt prognosis is Good.     Pt will continue to benefit from skilled outpatient physical therapy to address the deficits listed in the problem list box on initial evaluation, provide pt/family education and to maximize pt's level of independence in the home and community environment.     Pt's spiritual, cultural and educational needs considered and pt agreeable to plan of care and goals.    Anticipated barriers to physical therapy: none       Plan     Cont with current POC.       Charles Mckoy, PT

## 2018-11-20 ENCOUNTER — CLINICAL SUPPORT (OUTPATIENT)
Dept: REHABILITATION | Facility: HOSPITAL | Age: 53
End: 2018-11-20
Attending: ORTHOPAEDIC SURGERY
Payer: MEDICAID

## 2018-11-20 DIAGNOSIS — R53.1 DECREASED STRENGTH: ICD-10-CM

## 2018-11-20 DIAGNOSIS — M25.572 CHRONIC PAIN OF LEFT ANKLE: ICD-10-CM

## 2018-11-20 DIAGNOSIS — G89.29 CHRONIC PAIN OF LEFT ANKLE: ICD-10-CM

## 2018-11-20 DIAGNOSIS — M25.672 DECREASED RANGE OF MOTION OF LEFT ANKLE: ICD-10-CM

## 2018-11-20 PROCEDURE — 97110 THERAPEUTIC EXERCISES: CPT | Mod: PN

## 2018-11-20 NOTE — PROGRESS NOTES
"  Physical Therapy Daily Treatment Note     Name: Eduarda Casanova  Clinic Number: [id number]    Therapy Diagnosis:   Encounter Diagnoses   Name Primary?    Decreased range of motion of left ankle     Decreased strength     Chronic pain of left ankle      Physician: Blanca Kennedy MD    Visit Date: 11/20/2018  Physician Orders: Evaluate and Treat  Medical Diagnosis: sprain of calcaneofibular ligament left angle; sprain of deltoid ligament left ankle   Evaluation Date: 9/28/18  Authorization Period Expiration: 12/31/18  Visit #/Visits authorized: 5/20     Time In: 1030  Time Out: 1120  Total Billable Time: 45    Precautions: Standard    Subjective     Pt reports: Per patient her ankle and general body is sore today; she feels this is related to the sinus infection/cold she has. Patient reports that she is unsure the cause but has reached out to MD to follow up.     She was compliant with home exercise program.  Response to previous treatment: good   Functional change: minimal     Pain: 3/10  Location: left ankle    Objective     Maria Esther received therapeutic exercises to develop ROM and flexibility for 45 minutes including:     - gastroc stretch with strap 30" holds x 4 reps    - contract/relax 10" hold x 10 reps each into PF/DF   - assisted stretching 20" hold x 4 reps each INV/EVR   - gastroc/solues stretch on wedge 3 x 30 sec    - resisted (manually) with progressing resistance ankle 4 way, 20 reps in each direction   upright bike 8'   +Ankle inversion/eversion c red TB   SL Eversion 1# 30 reps   SL inversion 1# 30 reps   1 leg balance on incline 5 times 30 seconds  1 leg balance on Left blue foam ballerinas 5 reps    Standing blue rocker board  20 reps CW/CCW   Shuttle 2 leg press 4 bands 3 x 12 reps  Shuttle 1 leg press 2 bands 3 x 15 reps c red disc  Shuttle calf raises 2 leg 2 x 10 reps 2 bands   Shuttle Posterior tib calf raises 20 reps 2 bands      +seated BAPS board CW/CCW x2' each direction with all " sides touching     Not performed today:  Seated calf raises 3 x 20 reps      Maria Esther received the following manual therapy techniques: Joint mobilizations were applied to the the left ankle for 00 minutes, including   - AP mobilization grade III to improve motion    -STM/MFR and graston applied to tissues in posterior ankle for improved DF/PF  -mobilization on wedge, AP, grade III  -manual to ant tib with manual therapy to gastroc; ant ankle above lateral malleoli   -KT applied to pull ankle laterally    Maria Esther participated in neuromuscular re-education activities to improve:  for  minutes. The following activities were included:    Maria Esther received cold pack for 10 minutes to to decrease circulation, pain, and swelling post treatment       Home Exercises Provided and Patient Education Provided     Education provided: complinace with stretching, use and effects of Tubigrip to assist in decreasing swelling.     Written Home Exercises Provided: Patient instructed to cont prior HEP.  Exercises were reviewed and Maria Esther was able to demonstrate them prior to the end of the session.  Maria Esther demonstrated good  understanding of the education provided.       Assessment     Patient tolerated treatment fairly well today. Patient continues to develop soreness after engaging in exercises in clinic. Patient with conservative treatment today due to musculare soreness and general body aches related to secondary condition.     Maria Esther is progressing well towards her goals.   Pt prognosis is Good.      Pt will continue to benefit from skilled outpatient physical therapy to address the deficits listed in the problem list box on initial evaluation, provide pt/family education and to maximize pt's level of independence in the home and community environment.     Pt's spiritual, cultural and educational needs considered and pt agreeable to plan of care and goals.    Anticipated barriers to physical therapy: none       Plan     Cont with current  POC.       Charles Mckoy, PT

## 2018-11-30 ENCOUNTER — CLINICAL SUPPORT (OUTPATIENT)
Dept: REHABILITATION | Facility: HOSPITAL | Age: 53
End: 2018-11-30
Attending: ORTHOPAEDIC SURGERY
Payer: MEDICAID

## 2018-11-30 DIAGNOSIS — M25.572 CHRONIC PAIN OF LEFT ANKLE: ICD-10-CM

## 2018-11-30 DIAGNOSIS — M25.672 DECREASED RANGE OF MOTION OF LEFT ANKLE: ICD-10-CM

## 2018-11-30 DIAGNOSIS — G89.29 CHRONIC PAIN OF LEFT ANKLE: ICD-10-CM

## 2018-11-30 DIAGNOSIS — R53.1 DECREASED STRENGTH: ICD-10-CM

## 2018-11-30 PROCEDURE — 97110 THERAPEUTIC EXERCISES: CPT | Mod: PN

## 2018-11-30 NOTE — PROGRESS NOTES
"  Physical Therapy Daily Treatment Note     Name: Eduarda Casanova  Clinic Number: [id number]    Therapy Diagnosis:   Encounter Diagnoses   Name Primary?    Decreased range of motion of left ankle     Decreased strength     Chronic pain of left ankle      Physician: Blanca Kennedy MD    Visit Date: 11/30/2018  Physician Orders: Evaluate and Treat  Medical Diagnosis: sprain of calcaneofibular ligament left angle; sprain of deltoid ligament left ankle   Evaluation Date: 9/28/18  Authorization Period Expiration: 12/31/18  Visit #/Visits authorized: 5/20     Time In: 0930  Time Out: 1030  Total Billable Time: 45    Precautions: Standard    Subjective     Pt reports: that she still can not squat, if she picks something up she feels it in her left ankle and she is not able to go upstairs, patient also states the that sometimes her left foot will freeze     She was compliant with home exercise program.  Response to previous treatment: good   Functional change: minimal     Pain: 3/10  Location: left ankle    Objective     Maria Esther received therapeutic exercises to develop ROM and flexibility for 45 minutes including:     - gastroc stretch with strap 30" holds x 4 reps    - contract/relax 10" hold x 10 reps each into PF/DF   - assisted stretching 20" hold x 4 reps each INV/EVR   - gastroc/solues stretch on wedge 3 x 30 sec    - resisted (manually) with progressing resistance ankle 4 way, 20 reps in each direction   upright bike 8'   +Ankle inversion/eversion c red TB (all ankle PREs) 2 x 20 reps   SL Eversion 1# 30 reps   SL inversion 1# 30 reps   1 leg balance on incline 5 times 30 seconds  1 leg balance on Left blue foam ballerinas 5 reps    Standing blue rocker board  20 reps CW/CCW   Shuttle 2 leg press 4 bands 3 x 12 reps  Shuttle 1 leg press 2 bands 3 x 15 reps c red disc  Shuttle calf raises 2 leg 2 x 10 reps 2 bands   Shuttle Posterior tib calf raises 20 reps 2 bands      +Standing BAPS board CW/CCW x2' each " direction with all sides touching   Rocker board 1 minute (limited pain free range)     Not performed today:  Seated calf raises 3 x 20 reps      Maria Esther received the following manual therapy techniques: Joint mobilizations were applied to the the left ankle for 00 minutes, including   - AP mobilization grade III to improve motion    -STM/MFR and graston applied to tissues in posterior ankle for improved DF/PF  -mobilization on wedge, AP, grade III  -manual to ant tib with manual therapy to gastroc; ant ankle above lateral malleoli   -KT applied to pull ankle laterally    Maria Esther participated in neuromuscular re-education activities to improve:  for  minutes. The following activities were included:    Maria Esther received cold pack for 10 minutes to to decrease circulation, pain, and swelling post treatment       Home Exercises Provided and Patient Education Provided     Education provided: complinace with stretching, use and effects of Tubigrip to assist in decreasing swelling.     Written Home Exercises Provided: Patient instructed to cont prior HEP.  Exercises were reviewed and Maria Esther was able to demonstrate them prior to the end of the session.  Maria Esther demonstrated good  understanding of the education provided.     Ankle Range of Motion:   Ankle Right Left   Dorsiflexion 20 5/15   Plantarflexion 60 45   Inversion 50 35/40   Eversion 20 12/16      Strength:     Right Ankle    Left Ankle   Dorsiflexion: 5/5 Dorsiflexion: 5/5   Plantarflexion:  5/5 Plantarflexion: 4-/5   Inversion: 5/5 Inversion: 4-/5   Eversion: 5/5 Eversion: 4-/5           Assessment     Patient tolerated treatment well today. Rocker board addition caused soreness to be felt in her left ankle. Patient did well with increase in reps for ankle PREs in all directions.     Patient was reassessed during today's treatment and presents with increases in strength and ROM. Patient was able to meet 3/4 short term goals during this assessment period. Patient presents  with improvements in function but still a high level of pain. Patient will continue to engage in activities to promote greater endurance for better standing tolerance.     Maria Esther is progressing well towards her goals.   Pt prognosis is Good.      Pt will continue to benefit from skilled outpatient physical therapy to address the deficits listed in the problem list box on initial evaluation, provide pt/family education and to maximize pt's level of independence in the home and community environment.     Pt's spiritual, cultural and educational needs considered and pt agreeable to plan of care and goals.    Anticipated barriers to physical therapy: none     Prognosis: Excellent    Medical necessity is demonstrated by the following IMPAIRMENTS/PROBLEM LIST:     1) Increase in pain level limiting function   2) Decreased ROM of left ankle   3) Decreased strength   4) Decreased functional ability    5)Lack of HEP    Anticipated Barriers for physical therapy: none    GOALS: Short Term Goals:  6 weeks  1. Patient will report decreased in left ankle pain  <   / =  2  /10  to increase tolerance for daily activities.  - In progress   2. Patient will be able to report an 40% improvement in ability to engage in hobbies.  - 60%  - MET (11/30/2018)   3. Patient will be able to demonstrate an increased MMT for plantar flexion 3+/5  to increase tolerance for ADL and work activities.- (11/30/2018)   4. Patient will be able to tolerate HEP to improve ROM and independence with ADL's - In progress - (11/30/2018)     Long Term Goals: 12 weeks  1. Patient will report decreased in left ankle pain  <   / =  0 /10  to increase tolerance for daily activities.   2. Patient will be able to report an 40% improvement in ability to engage in hobbies.   3. Patient will be able to demonstrate an increased MMT for plantar flexion 3+/5  to increase tolerance for ADL and work activities.  4. Patient will be able to report at CJ level (20-40% impaired)  on Modified FIM score for mobility to demonstrate increase in LE function and mobility in home and community environment.   5. Patient will be able to demonstrate an increase of 10 degrees of overall left ankle motion to improve mobility and function.  5. Patient will be able to demonstrate independence with HEP to improve ROM and independence with ADL's       Plan     Cont with current POC. Cont PT 1-3 times a week for 12 weeks during the certification period (9/28/2018 - 12/28/2018) PN Due: (10/28/2018)        Elena Zarate, BARON

## 2018-12-04 ENCOUNTER — CLINICAL SUPPORT (OUTPATIENT)
Dept: REHABILITATION | Facility: HOSPITAL | Age: 53
End: 2018-12-04
Attending: ORTHOPAEDIC SURGERY
Payer: MEDICAID

## 2018-12-04 DIAGNOSIS — M25.572 ACUTE LEFT ANKLE PAIN: ICD-10-CM

## 2018-12-04 DIAGNOSIS — M25.572 CHRONIC PAIN OF LEFT ANKLE: ICD-10-CM

## 2018-12-04 DIAGNOSIS — S93.412A SPRAIN OF CALCANEOFIBULAR LIGAMENT OF LEFT ANKLE, INITIAL ENCOUNTER: ICD-10-CM

## 2018-12-04 DIAGNOSIS — S93.422A SPRAIN OF DELTOID LIGAMENT OF LEFT ANKLE, INITIAL ENCOUNTER: ICD-10-CM

## 2018-12-04 DIAGNOSIS — R53.1 DECREASED STRENGTH: ICD-10-CM

## 2018-12-04 DIAGNOSIS — M25.672 DECREASED RANGE OF MOTION OF LEFT ANKLE: Primary | ICD-10-CM

## 2018-12-04 DIAGNOSIS — G89.29 CHRONIC PAIN OF LEFT ANKLE: ICD-10-CM

## 2018-12-04 PROCEDURE — 97110 THERAPEUTIC EXERCISES: CPT | Mod: PN

## 2018-12-04 NOTE — PROGRESS NOTES
"  Physical Therapy Daily Treatment Note     Name: Eduarda Casanova  Clinic Number: [id number]    Therapy Diagnosis:   Encounter Diagnoses   Name Primary?    Decreased range of motion of left ankle Yes    Acute left ankle pain     Sprain of calcaneofibular ligament of left ankle, initial encounter     Decreased strength     Chronic pain of left ankle     Sprain of deltoid ligament of left ankle, initial encounter      Physician: Blanca Kennedy MD    Visit Date: 12/4/2018  Physician Orders: Evaluate and Treat  Medical Diagnosis: sprain of calcaneofibular ligament left angle; sprain of deltoid ligament left ankle   Evaluation Date: 9/28/18  Authorization Period Expiration: 12/31/18  Visit #/Visits authorized: 13/20     Time In:  0935  Time Out: 1035  Total Billable Time:  30'     Precautions: Standard    Subjective     Pt reports: that she had an increase in pain yesterday throughout the top of her foot.  Pt states that she has been having more pain in the inside of her ankle.  Pt states that the pain is better today.   She was compliant with home exercise program.  Response to previous treatment: good   Functional change: minimal     Pain: 3/10  Location: left ankle    Objective     Maria Esther received therapeutic exercises to develop ROM and flexibility for 25 minutes including:    upright bike 8'    - gastroc/solues stretch on wedge 3 x 30 sec   Rocker board 1 minute (limited pain free range)    - gastroc stretch with strap 30" holds x 4 reps   +Ankle inversion/eversion c red TB (all ankle PREs) 2 x 20 reps   SL Eversion 1# 30 reps   SL inversion 1# 30 reps     Not performed today:   - contract/relax 10" hold x 10 reps each into PF/DF   - assisted stretching 20" hold x 4 reps each INV/EVR   - resisted (manually) with progressing resistance ankle 4 way, 20 reps in each direction   1 leg balance on incline 5 times 30 seconds  1 leg balance on Left blue foam ballerinas 5 reps    Standing blue rocker board  20 reps " CW/CCW   Standing BAPS board CW/CCW x2' each direction with all sides touching     Seated calf raises 3 x 20 reps    Shuttle 2 leg press 4 bands 3 x 12 reps  Shuttle 1 leg press 2 bands 3 x 15 reps c red disc  Shuttle calf raises 2 leg 2 x 10 reps 2 bands   Shuttle Posterior tib calf raises 20 reps 2 bands              Maria Esther received the following manual therapy techniques: Joint mobilizations were applied to the the left ankle for 25 minutes, including   - AP mobilization grade III to improve motion    -STM/MFR and graston applied to tissues in posterior ankle for improved DF/PF  -mobilization on wedge, AP, grade II  -manual to ant tib with manual therapy to gastroc; ant ankle above lateral malleoli   -KT applied to pull ankle laterally    Maria Esther participated in neuromuscular re-education activities to improve:  for  minutes. The following activities were included:    Maria Esther received cold pack for 10 minutes to to decrease circulation, pain, and swelling post treatment       Home Exercises Provided and Patient Education Provided     Education reviewed: complinace with stretching.    Written Home Exercises Provided: Patient instructed to cont prior HEP.  Exercises were reviewed and Maria Esther was able to demonstrate them prior to the end of the session.  Maria Esther demonstrated good  understanding of the education provided.        Assessment     Patient tolerated treatment well today.  Patient with fair tolerance to exercises due to recent exacerbation of pain.  Patient with increased tissue restrictions throughout anterior ankle and interosseous border of the tibia.   Patient responded well to manual care and taping with decreased pain and improved mobility achieved.     Maria Esther is progressing well towards her goals.   Pt prognosis is Good.      Pt will continue to benefit from skilled outpatient physical therapy to address the deficits listed in the problem list box on initial evaluation, provide pt/family education and to  maximize pt's level of independence in the home and community environment.     Pt's spiritual, cultural and educational needs considered and pt agreeable to plan of care and goals.    Anticipated barriers to physical therapy: none     Prognosis: Excellent    Medical necessity is demonstrated by the following IMPAIRMENTS/PROBLEM LIST:     1) Increase in pain level limiting function   2) Decreased ROM of left ankle   3) Decreased strength   4) Decreased functional ability    5)Lack of HEP    Anticipated Barriers for physical therapy: none    GOALS: Short Term Goals:  6 weeks  1. Patient will report decreased in left ankle pain  <   / =  2  /10  to increase tolerance for daily activities.  - In progress   2. Patient will be able to report an 40% improvement in ability to engage in hobbies.  - 60%  - MET (11/30/2018)   3. Patient will be able to demonstrate an increased MMT for plantar flexion 3+/5  to increase tolerance for ADL and work activities.- (11/30/2018)   4. Patient will be able to tolerate HEP to improve ROM and independence with ADL's - In progress - (11/30/2018)     Long Term Goals: 12 weeks  1. Patient will report decreased in left ankle pain  <   / =  0 /10  to increase tolerance for daily activities.   2. Patient will be able to report an 40% improvement in ability to engage in hobbies.   3. Patient will be able to demonstrate an increased MMT for plantar flexion 3+/5  to increase tolerance for ADL and work activities.  4. Patient will be able to report at CJ level (20-40% impaired) on Modified FIM score for mobility to demonstrate increase in LE function and mobility in home and community environment.   5. Patient will be able to demonstrate an increase of 10 degrees of overall left ankle motion to improve mobility and function.  5. Patient will be able to demonstrate independence with HEP to improve ROM and independence with ADL's       Plan     Cont with current POC. Cont PT 1-3 times a week for 12  weeks during the certification period (9/28/2018 - 12/28/2018) PN Due: 12/30/18      Stephanie Nagy, PTA

## 2018-12-06 ENCOUNTER — CLINICAL SUPPORT (OUTPATIENT)
Dept: REHABILITATION | Facility: HOSPITAL | Age: 53
End: 2018-12-06
Attending: ORTHOPAEDIC SURGERY
Payer: MEDICAID

## 2018-12-06 DIAGNOSIS — R53.1 DECREASED STRENGTH: ICD-10-CM

## 2018-12-06 DIAGNOSIS — M25.672 DECREASED RANGE OF MOTION OF LEFT ANKLE: ICD-10-CM

## 2018-12-06 DIAGNOSIS — G89.29 CHRONIC PAIN OF LEFT ANKLE: ICD-10-CM

## 2018-12-06 DIAGNOSIS — M25.572 CHRONIC PAIN OF LEFT ANKLE: ICD-10-CM

## 2018-12-06 PROCEDURE — 97140 MANUAL THERAPY 1/> REGIONS: CPT | Mod: PN

## 2018-12-06 NOTE — PROGRESS NOTES
"  Physical Therapy Daily Treatment Note     Name: Eduarda Casanova  Clinic Number: [id number]    Therapy Diagnosis:   Encounter Diagnoses   Name Primary?    Decreased range of motion of left ankle     Decreased strength     Chronic pain of left ankle      Physician: Blanca Kennedy MD    Visit Date: 12/6/2018  Physician Orders: Evaluate and Treat  Medical Diagnosis: sprain of calcaneofibular ligament left angle; sprain of deltoid ligament left ankle   Evaluation Date: 9/28/18  Authorization Period Expiration: 12/31/18  Visit #/Visits authorized: 13/20     Time In:  0935  Time Out: 1035  Total Billable Time:  30'     Precautions: Standard    Subjective     Pt reports: increased ankle pain in the anterior left ankle; patient reports that the pain started when she started wearing a lace up ankle brace and she only feels relief upon removing the brace.   She was compliant with home exercise program.  Response to previous treatment: good   Functional change: minimal     Pain: 3/10  Location: left ankle    Objective     Maria Esther received therapeutic exercises to develop ROM and flexibility for 25 minutes including:    upright bike 8'    - gastroc/solues stretch on wedge 3 x 30 sec   Rocker board 1 minute (limited pain free range)    - gastroc stretch with strap 30" holds x 4 reps   Ankle inversion/eversion c red TB (all ankle PREs) 2 x 20 reps   SL Eversion 1# 30 reps   SL inversion 1# 30 reps     Not performed today:   - contract/relax 10" hold x 10 reps each into PF/DF   - assisted stretching 20" hold x 4 reps each INV/EVR   - resisted (manually) with progressing resistance ankle 4 way, 20 reps in each direction   1 leg balance on incline 5 times 30 seconds  1 leg balance on Left blue foam ballerinas 5 reps    Standing blue rocker board  20 reps CW/CCW   Standing BAPS board CW/CCW x2' each direction with all sides touching     Seated calf raises 3 x 20 reps    Shuttle 2 leg press 4 bands 3 x 12 reps  Shuttle 1 leg " press 2 bands 3 x 15 reps c red disc  Shuttle calf raises 2 leg 2 x 10 reps 2 bands   Shuttle Posterior tib calf raises 20 reps 2 bands              Maria Esther received the following manual therapy techniques: Joint mobilizations were applied to the the left ankle for 25 minutes, including   - AP mobilization grade III to improve motion    -STM/MFR and graston applied to tissues in posterior ankle for improved DF/PF  -mobilization on wedge, AP, grade II  -manual to ant tib with manual therapy to gastroc; ant ankle above lateral malleoli   -KT applied to pull ankle laterally    Maria Esther participated in neuromuscular re-education activities to improve:  for  minutes. The following activities were included:    Maria Esther received cold pack for 10 minutes to to decrease circulation, pain, and swelling post treatment       Home Exercises Provided and Patient Education Provided     Education reviewed: complinace with stretching.    Written Home Exercises Provided: Patient instructed to cont prior HEP.  Exercises were reviewed and Maria Esther was able to demonstrate them prior to the end of the session.  Maria Esther demonstrated good  understanding of the education provided.        Assessment     Patient tolerated treatment well today.  Patient with fair tolerance to exercises due to recent exacerbation of pain. Strengthening program held today secondary to complaints of pain with inc WB. Patient with symmetrical resting position of ankle and due to inc pain with brace was encouraged to d/c brace at this time.   Maria Esther is progressing well towards her goals.   Pt prognosis is Good.      Pt will continue to benefit from skilled outpatient physical therapy to address the deficits listed in the problem list box on initial evaluation, provide pt/family education and to maximize pt's level of independence in the home and community environment.     Pt's spiritual, cultural and educational needs considered and pt agreeable to plan of care and  goals.    Anticipated barriers to physical therapy: none     Prognosis: Excellent    Medical necessity is demonstrated by the following IMPAIRMENTS/PROBLEM LIST:     1) Increase in pain level limiting function   2) Decreased ROM of left ankle   3) Decreased strength   4) Decreased functional ability    5)Lack of HEP    Anticipated Barriers for physical therapy: none    GOALS: Short Term Goals:  6 weeks  1. Patient will report decreased in left ankle pain  <   / =  2  /10  to increase tolerance for daily activities.  - In progress   2. Patient will be able to report an 40% improvement in ability to engage in hobbies.  - 60%  - MET (11/30/2018)   3. Patient will be able to demonstrate an increased MMT for plantar flexion 3+/5  to increase tolerance for ADL and work activities.- (11/30/2018)   4. Patient will be able to tolerate HEP to improve ROM and independence with ADL's - In progress - (11/30/2018)     Long Term Goals: 12 weeks  1. Patient will report decreased in left ankle pain  <   / =  0 /10  to increase tolerance for daily activities.   2. Patient will be able to report an 40% improvement in ability to engage in hobbies.   3. Patient will be able to demonstrate an increased MMT for plantar flexion 3+/5  to increase tolerance for ADL and work activities.  4. Patient will be able to report at CJ level (20-40% impaired) on Modified FIM score for mobility to demonstrate increase in LE function and mobility in home and community environment.   5. Patient will be able to demonstrate an increase of 10 degrees of overall left ankle motion to improve mobility and function.  5. Patient will be able to demonstrate independence with HEP to improve ROM and independence with ADL's       Plan     Cont with current POC. Cont PT 1-3 times a week for 12 weeks during the certification period (9/28/2018 - 12/28/2018) PN Due: 12/30/18      Charles Mckoy, PT

## 2018-12-18 ENCOUNTER — CLINICAL SUPPORT (OUTPATIENT)
Dept: REHABILITATION | Facility: HOSPITAL | Age: 53
End: 2018-12-18
Attending: ORTHOPAEDIC SURGERY
Payer: MEDICAID

## 2018-12-18 DIAGNOSIS — R53.1 DECREASED STRENGTH: ICD-10-CM

## 2018-12-18 DIAGNOSIS — M25.672 DECREASED RANGE OF MOTION OF LEFT ANKLE: ICD-10-CM

## 2018-12-18 DIAGNOSIS — M25.572 CHRONIC PAIN OF LEFT ANKLE: ICD-10-CM

## 2018-12-18 DIAGNOSIS — G89.29 CHRONIC PAIN OF LEFT ANKLE: ICD-10-CM

## 2018-12-18 PROCEDURE — 97110 THERAPEUTIC EXERCISES: CPT | Mod: PN

## 2018-12-18 NOTE — PROGRESS NOTES
"  Physical Therapy Daily Treatment Note     Name: Eduarda Casanova  Clinic Number: [id number]    Therapy Diagnosis:   Encounter Diagnoses   Name Primary?    Decreased range of motion of left ankle     Decreased strength     Chronic pain of left ankle      Physician: Blanca Kennedy MD    Visit Date: 12/18/2018  Physician Orders: Evaluate and Treat  Medical Diagnosis: sprain of calcaneofibular ligament left angle; sprain of deltoid ligament left ankle   Evaluation Date: 9/28/18  Authorization Period Expiration: 12/31/18  Visit #/Visits authorized: 14/20     Time In:  0835  Time Out: 1035  Total Billable Time: 60    Precautions: Standard    Subjective     Pt reports: she almost tripped the other day. Patient states that she was not able to turn left. Patient also reports that she has some tape left on her skin after the application last visit.   She was compliant with home exercise program.  Response to previous treatment: good   Functional change: minimal     Pain: 3/10  Location: left ankle    Objective     Maria Esther received therapeutic exercises to develop ROM and flexibility for 30 minutes including:    upright bike 8'    - gastroc/solues stretch on wedge 3 x 30 sec   Rocker board 1 minute (limited pain free range)    - gastroc stretch with strap 30" holds x 4 reps   Ankle inversion/eversion c red TB (all ankle PREs) 2 x 20 reps   SL Eversion 1# 30 reps   SL inversion 1# 30 reps   1 leg balance on incline 5 times 30 seconds  1 leg balance on Left blue foam ballerinas 5 reps    Standing blue rocker board  20 reps CW/CCW   Standing calf raises 30 reps   Standing calf raises c inversion 30 reps   Heel/toe walking 1 lap each    Not performed today:   - contract/relax 10" hold x 10 reps each into PF/DF   - assisted stretching 20" hold x 4 reps each INV/EVR   - resisted (manually) with progressing resistance ankle 4 way, 20 reps in each direction   Standing BAPS board CW/CCW x2' each direction with all sides touching "     Seated calf raises 3 x 20 reps    Shuttle 2 leg press 4 bands 3 x 12 reps  Shuttle 1 leg press 2 bands 3 x 15 reps c red disc  Shuttle calf raises 2 leg 2 x 10 reps 2 bands   Shuttle Posterior tib calf raises 20 reps 2 bands              Maria Esther received the following manual therapy techniques: Joint mobilizations were applied to the the left ankle for 25 minutes, including   - AP mobilization grade III to improve motion    -STM/MFR and graston applied to tissues in posterior ankle for improved DF/PF  -mobilization on wedge, AP, grade II  -manual to ant tib with manual therapy to gastroc; ant ankle above lateral malleoli   -KT applied to pull ankle laterally    Maria Esther participated in neuromuscular re-education activities to improve:  for  minutes. The following activities were included:    Maria Esther received cold pack for 10 minutes to to decrease circulation, pain, and swelling post treatment       Home Exercises Provided and Patient Education Provided     Education reviewed: complinace with stretching.    Written Home Exercises Provided: Patient instructed to cont prior HEP.  Exercises were reviewed and Maria Esther was able to demonstrate them prior to the end of the session.  Maria Esther demonstrated good  understanding of the education provided.        Assessment     Patient tolerated treatment well today.  Patient was able to tolerate more standing exercises during today's treatment. Patient presents with an increased tenderness present in the lateral aspect of the ankle. Patient had some residual taping present on her medial and lateral ankle. Alcohol was used to remove the presence of this tape. Patient continues to demonstrate poor arthrokinematic's of the ankle causing restrictions and pain in ankle motion.    Maria Esther is progressing well towards her goals.   Pt prognosis is Good.      Pt will continue to benefit from skilled outpatient physical therapy to address the deficits listed in the problem list box on initial  evaluation, provide pt/family education and to maximize pt's level of independence in the home and community environment.     Pt's spiritual, cultural and educational needs considered and pt agreeable to plan of care and goals.    Anticipated barriers to physical therapy: none     Prognosis: Excellent    Medical necessity is demonstrated by the following IMPAIRMENTS/PROBLEM LIST:     1) Increase in pain level limiting function   2) Decreased ROM of left ankle   3) Decreased strength   4) Decreased functional ability    5)Lack of HEP    Anticipated Barriers for physical therapy: none    GOALS: Short Term Goals:  6 weeks  1. Patient will report decreased in left ankle pain  <   / =  2  /10  to increase tolerance for daily activities.  - In progress   2. Patient will be able to report an 40% improvement in ability to engage in hobbies.  - 60%  - MET (11/30/2018)   3. Patient will be able to demonstrate an increased MMT for plantar flexion 3+/5  to increase tolerance for ADL and work activities.- (11/30/2018)   4. Patient will be able to tolerate HEP to improve ROM and independence with ADL's - In progress - (11/30/2018)     Long Term Goals: 12 weeks  1. Patient will report decreased in left ankle pain  <   / =  0 /10  to increase tolerance for daily activities.   2. Patient will be able to report an 40% improvement in ability to engage in hobbies.   3. Patient will be able to demonstrate an increased MMT for plantar flexion 3+/5  to increase tolerance for ADL and work activities.  4. Patient will be able to report at CJ level (20-40% impaired) on Modified FIM score for mobility to demonstrate increase in LE function and mobility in home and community environment.   5. Patient will be able to demonstrate an increase of 10 degrees of overall left ankle motion to improve mobility and function.  5. Patient will be able to demonstrate independence with HEP to improve ROM and independence with ADL's       Plan     Cont with  current POC. Cont PT 1-3 times a week for 12 weeks during the certification period (9/28/2018 - 12/28/2018) PN Due: 12/30/18      Elena Zarate, BARON

## 2018-12-20 ENCOUNTER — CLINICAL SUPPORT (OUTPATIENT)
Dept: REHABILITATION | Facility: HOSPITAL | Age: 53
End: 2018-12-20
Attending: ORTHOPAEDIC SURGERY
Payer: MEDICAID

## 2018-12-20 DIAGNOSIS — R53.1 DECREASED STRENGTH: ICD-10-CM

## 2018-12-20 DIAGNOSIS — G89.29 CHRONIC PAIN OF LEFT ANKLE: ICD-10-CM

## 2018-12-20 DIAGNOSIS — M25.572 CHRONIC PAIN OF LEFT ANKLE: ICD-10-CM

## 2018-12-20 DIAGNOSIS — M25.672 DECREASED RANGE OF MOTION OF LEFT ANKLE: Primary | ICD-10-CM

## 2018-12-20 DIAGNOSIS — M25.572 ACUTE LEFT ANKLE PAIN: ICD-10-CM

## 2018-12-20 PROCEDURE — 97110 THERAPEUTIC EXERCISES: CPT | Mod: PN

## 2018-12-20 NOTE — PROGRESS NOTES
"  Physical Therapy Daily Treatment Note     Name: Eduarda Casanova  Clinic Number: [id number]    Therapy Diagnosis:   Encounter Diagnoses   Name Primary?    Decreased range of motion of left ankle Yes    Decreased strength     Chronic pain of left ankle     Acute left ankle pain      Physician: Blanca Kennedy MD    Visit Date: 12/20/2018  Physician Orders: Evaluate and Treat  Medical Diagnosis: sprain of calcaneofibular ligament left angle; sprain of deltoid ligament left ankle   Evaluation Date: 9/28/18  Authorization Period Expiration: 12/31/18  Visit #/Visits authorized: 16/20     Time In:  0730  Time Out: 0839  Total Billable Time: 69 minutes (One on One with PTA for 60 minutes)    Precautions: Standard    Subjective     Pt reports: last night her pain increased to 5/10 in the middle of the night upon getting up to use the bathroom.   She was compliant with home exercise program.  Response to previous treatment: good   Functional change: minimal     Pain: 3/10  Location: left ankle    Objective     Maria Esther received therapeutic exercises to develop ROM and flexibility for 45 minutes including:    upright bike 8'    - gastroc/solues stretch on wedge 3 x 30 sec   Rocker board 1 minute (limited pain free range)    - gastroc stretch with strap 30" holds x 4 reps   Ankle inversion/eversion c red TB (all ankle PREs) 2 x 20 reps   SL Eversion 1# 30 reps   SL inversion 1# 30 reps   1 leg balance on incline 5 times 30 seconds  1 leg balance on Left blue foam ballerinas 5 reps    Standing blue rocker board  20 reps CW/CCW   Standing calf raises 30 reps   Standing calf raises c inversion 30 reps   Heel/toe walking 1 lap each    Not performed today:   - contract/relax 10" hold x 10 reps each into PF/DF   - assisted stretching 20" hold x 4 reps each INV/EVR   - resisted (manually) with progressing resistance ankle 4 way, 20 reps in each direction   Standing BAPS board CW/CCW x2' each direction with all sides touching "     Seated calf raises 3 x 20 reps    Shuttle 2 leg press 4 bands 3 x 12 reps  Shuttle 1 leg press 2 bands 3 x 15 reps c red disc  Shuttle calf raises 2 leg 2 x 10 reps 2 bands   Shuttle Posterior tib calf raises 20 reps 2 bands              Maria Esther received the following manual therapy techniques: Joint mobilizations were applied to the the left ankle for 25 minutes, including   - AP mobilization grade III to improve motion    -STM/MFR and graston applied to tissues in posterior ankle for improved DF/PF  -mobilization on wedge, AP, grade II  -manual to ant tib with manual therapy to gastroc; ant ankle above lateral malleoli   -KT applied to pull ankle laterally    Maria Esther participated in neuromuscular re-education activities to improve:  for  minutes. The following activities were included:    Maria Esther received cold pack for 10 minutes to to decrease circulation, pain, and swelling post treatment       Home Exercises Provided and Patient Education Provided     Education reviewed: complinace with stretching.    Written Home Exercises Provided: Patient instructed to cont prior HEP.  Exercises were reviewed and Maria Esther was able to demonstrate them prior to the end of the session.  Maria Esther demonstrated good  understanding of the education provided.        Assessment     Pt tolerated treatment good today without exacerbation of pain.  Displays decreased neuromuscular control and ankle strategy.  L lateral patella sits inferior to its counterpart.  Mobilizations to assist with talus gliding and superior glides to lateral malleolus to improve ankle dorsiflexion.  STM/MFR to tibialis anterior and everter region with increased myofascial inconsistencies noted. Attempted taping for stabilization, however, unsuccessful due to deep tissue cream.    Maria Esther is progressing well towards her goals.   Pt prognosis is Good.      Pt will continue to benefit from skilled outpatient physical therapy to address the deficits listed in the problem  list box on initial evaluation, provide pt/family education and to maximize pt's level of independence in the home and community environment.     Pt's spiritual, cultural and educational needs considered and pt agreeable to plan of care and goals.    Anticipated barriers to physical therapy: none     Prognosis: Excellent    Medical necessity is demonstrated by the following IMPAIRMENTS/PROBLEM LIST:     1) Increase in pain level limiting function   2) Decreased ROM of left ankle   3) Decreased strength   4) Decreased functional ability    5)Lack of HEP    Anticipated Barriers for physical therapy: none    GOALS: Short Term Goals:  6 weeks  1. Patient will report decreased in left ankle pain  <   / =  2  /10  to increase tolerance for daily activities.  - In progress   2. Patient will be able to report an 40% improvement in ability to engage in hobbies.  - 60%  - MET (11/30/2018)   3. Patient will be able to demonstrate an increased MMT for plantar flexion 3+/5  to increase tolerance for ADL and work activities.- (11/30/2018)   4. Patient will be able to tolerate HEP to improve ROM and independence with ADL's - In progress - (11/30/2018)     Long Term Goals: 12 weeks  1. Patient will report decreased in left ankle pain  <   / =  0 /10  to increase tolerance for daily activities.   2. Patient will be able to report an 40% improvement in ability to engage in hobbies.   3. Patient will be able to demonstrate an increased MMT for plantar flexion 3+/5  to increase tolerance for ADL and work activities.  4. Patient will be able to report at CJ level (20-40% impaired) on Modified FIM score for mobility to demonstrate increase in LE function and mobility in home and community environment.   5. Patient will be able to demonstrate an increase of 10 degrees of overall left ankle motion to improve mobility and function.  5. Patient will be able to demonstrate independence with HEP to improve ROM and independence with  ADL's       Plan     Cont with current POC. Cont PT 1-3 times a week for 12 weeks during the certification period (9/28/2018 - 12/28/2018) PN Due: 12/30/18      Natacha Constantino, PTA

## 2020-03-16 ENCOUNTER — TELEPHONE (OUTPATIENT)
Dept: INFECTIOUS DISEASES | Facility: CLINIC | Age: 55
End: 2020-03-16

## 2020-03-16 NOTE — TELEPHONE ENCOUNTER
Spoke to patient and patient is not sure if her trip will still happen. Patient is to call back to reschedule if she will have to go on trip. As of now appt cancelled.

## 2020-07-30 DIAGNOSIS — Z12.31 ENCOUNTER FOR SCREENING MAMMOGRAM FOR MALIGNANT NEOPLASM OF BREAST: Primary | ICD-10-CM

## 2020-08-06 ENCOUNTER — HOSPITAL ENCOUNTER (OUTPATIENT)
Dept: RADIOLOGY | Facility: HOSPITAL | Age: 55
Discharge: HOME | End: 2020-08-06
Attending: GENERAL PRACTICE
Payer: MEDICAID

## 2020-08-06 VITALS — HEIGHT: 68 IN | BODY MASS INDEX: 38.65 KG/M2 | WEIGHT: 255 LBS

## 2020-08-06 DIAGNOSIS — Z12.31 ENCOUNTER FOR SCREENING MAMMOGRAM FOR MALIGNANT NEOPLASM OF BREAST: ICD-10-CM

## 2020-08-06 PROCEDURE — 77067 SCR MAMMO BI INCL CAD: CPT | Mod: TC

## 2020-08-06 PROCEDURE — 77067 MAMMO DIGITAL SCREENING BILAT WITH CAD: ICD-10-PCS | Mod: 26,,, | Performed by: RADIOLOGY

## 2020-08-06 PROCEDURE — 77067 SCR MAMMO BI INCL CAD: CPT | Mod: 26,,, | Performed by: RADIOLOGY

## 2020-10-14 ENCOUNTER — OFFICE VISIT (OUTPATIENT)
Dept: OBSTETRICS AND GYNECOLOGY | Facility: CLINIC | Age: 55
End: 2020-10-14
Payer: MEDICAID

## 2020-10-14 VITALS
DIASTOLIC BLOOD PRESSURE: 62 MMHG | WEIGHT: 254.63 LBS | SYSTOLIC BLOOD PRESSURE: 118 MMHG | BODY MASS INDEX: 38.72 KG/M2

## 2020-10-14 DIAGNOSIS — Z12.39 SCREENING BREAST EXAMINATION: ICD-10-CM

## 2020-10-14 DIAGNOSIS — Z01.419 ENCOUNTER FOR GYNECOLOGICAL EXAMINATION WITHOUT ABNORMAL FINDING: Primary | ICD-10-CM

## 2020-10-14 DIAGNOSIS — B37.9 YEAST INFECTION: ICD-10-CM

## 2020-10-14 PROCEDURE — 87624 HPV HI-RISK TYP POOLED RSLT: CPT

## 2020-10-14 PROCEDURE — 99386 PR PREVENTIVE VISIT,NEW,40-64: ICD-10-PCS | Mod: S$PBB,,, | Performed by: OBSTETRICS & GYNECOLOGY

## 2020-10-14 PROCEDURE — 99999 PR PBB SHADOW E&M-EST. PATIENT-LVL II: CPT | Mod: PBBFAC,,, | Performed by: OBSTETRICS & GYNECOLOGY

## 2020-10-14 PROCEDURE — 99999 PR PBB SHADOW E&M-EST. PATIENT-LVL II: ICD-10-PCS | Mod: PBBFAC,,, | Performed by: OBSTETRICS & GYNECOLOGY

## 2020-10-14 PROCEDURE — 88175 CYTOPATH C/V AUTO FLUID REDO: CPT

## 2020-10-14 PROCEDURE — 99212 OFFICE O/P EST SF 10 MIN: CPT | Mod: PBBFAC | Performed by: OBSTETRICS & GYNECOLOGY

## 2020-10-14 PROCEDURE — 99386 PREV VISIT NEW AGE 40-64: CPT | Mod: S$PBB,,, | Performed by: OBSTETRICS & GYNECOLOGY

## 2020-10-14 RX ORDER — LEVOTHYROXINE SODIUM 137 UG/1
137 TABLET ORAL
COMMUNITY

## 2020-10-14 RX ORDER — ARIPIPRAZOLE 5 MG/1
5 TABLET ORAL DAILY
COMMUNITY

## 2020-10-14 RX ORDER — CITALOPRAM 40 MG/1
40 TABLET, FILM COATED ORAL DAILY
COMMUNITY

## 2020-10-14 RX ORDER — BUPROPION HYDROCHLORIDE 100 MG/1
200 TABLET ORAL 2 TIMES DAILY
COMMUNITY

## 2020-10-14 RX ORDER — TRAZODONE HYDROCHLORIDE 100 MG/1
50 TABLET ORAL NIGHTLY
COMMUNITY

## 2020-10-14 RX ORDER — TRIAMCINOLONE ACETONIDE 0.25 MG/G
CREAM TOPICAL 2 TIMES DAILY
Qty: 80 G | Refills: 1 | Status: SHIPPED | OUTPATIENT
Start: 2020-10-14

## 2020-10-14 RX ORDER — NYSTATIN 100000 U/G
CREAM TOPICAL 2 TIMES DAILY
Qty: 30 G | Refills: 1 | Status: SHIPPED | OUTPATIENT
Start: 2020-10-14

## 2020-10-14 NOTE — LETTER
October 14, 2020      Jesse May III, MD  [address] [facility] [address]  [place] [facility]  [place] [address]           [facility] - OB/ GYN  [address]  [facility] [address]  Phone: [phone]          Patient: Eduarda Casanova   MR Number: [id number]   YOB: 1965   Date of Visit: 10/14/2020       Dear Dr. Jesse May III:    Thank you for referring Eduarda Casanova to me for evaluation. Attached you will find relevant portions of my assessment and plan of care.    If you have questions, please do not hesitate to call me. I look forward to following Eduarda Casanova along with you.    Sincerely,    Sangita Holder MD    Enclosure  CC:  No Recipients    If you would like to receive this communication electronically, please contact [email or web address] or [phone] to request more information on [employer] access.    For providers and/or their staff who would like to refer a patient to [facility], please contact us through our one-stop-shop provider referral line, [facility], at [phone].    If you feel you have received this communication in error or would no longer like to receive these types of communications, please e-mail [email or web address]

## 2020-10-14 NOTE — PROGRESS NOTES
Subjective:       Patient ID: Eduarda Casanova is a 55 y.o. female.    Chief Complaint:  Well Woman      History of Present Illness  HPI  Annual Exam-Postmenopausal  Patient presents for annual exam. The patient has no complaints today. The patient is not currently sexually active. GYN screening history: last pap: was normal and last mammogram: was normal. The patient is not taking hormone replacement therapy. Patient denies post-menopausal vaginal bleeding. The patient wears seatbelts: yes. The patient participates in regular exercise: not asked. Has the patient ever been transfused or tattooed?: yes. The patient reports that there is not domestic violence in her life.       GYN & OB History  No LMP recorded. Patient is postmenopausal.   Date of Last Pap: No result found    OB History    Para Term  AB Living   0 0 0 0 0 0   SAB TAB Ectopic Multiple Live Births   0 0 0 0 0   Obstetric Comments   GYN Hx:   Menarche at 8 years old   Postmenopausal at 52 years old.      Past Medical History:  History reviewed. No pertinent past medical history.    Past Surgical History:  Past Surgical History:   Procedure Laterality Date    ANKLE SURGERY      SINUS SURGERY      WISDOM TOOTH EXTRACTION         Family History:  Family History   Problem Relation Age of Onset    Breast cancer Maternal Grandmother     Breast cancer Maternal Aunt        Allergies:  Review of patient's allergies indicates:  No Known Allergies    Medications:  Current Outpatient Medications on File Prior to Visit   Medication Sig Dispense Refill    ARIPiprazole (ABILIFY) 5 MG Tab Take 5 mg by mouth once daily. Patient reports taking 2.5 mg as prescribed by doctor.      buPROPion (WELLBUTRIN) 100 MG tablet Take 200 mg by mouth 2 (two) times daily.      citalopram (CELEXA) 40 MG tablet Take 40 mg by mouth once daily.      levothyroxine (SYNTHROID) 137 MCG Tab tablet Take 137 mcg by mouth before breakfast.      traZODone (DESYREL) 100  MG tablet Take 50 mg by mouth every evening.       No current facility-administered medications on file prior to visit.        Social History:  Social History     Tobacco Use    Smoking status: Not on file   Substance Use Topics    Alcohol use: Not on file    Drug use: Not on file            Review of Systems  Review of Systems   Constitutional: Positive for unexpected weight change.   HENT: Negative.    Eyes: Negative.    Respiratory: Negative.    Cardiovascular: Negative.    Gastrointestinal: Positive for bloating.   Endocrine: Negative.    Genitourinary: Negative.    Musculoskeletal: Positive for back pain and joint swelling.   Integumentary:  Positive for rash.   Neurological: Negative.    Hematological: Negative.    Psychiatric/Behavioral: Negative.    Breast: negative.            Objective:    Physical Exam:   Constitutional: She is oriented to person, place, and time. She appears well-nourished.    HENT:   Head: Normocephalic and atraumatic.    Eyes: EOM are normal. Right eye exhibits normal extraocular motion. Left eye exhibits normal extraocular motion.    Neck: Neck supple. No thyromegaly present.    Cardiovascular: Normal rate.     Pulmonary/Chest: Effort normal. No respiratory distress. Right breast exhibits no mass, no skin change and no tenderness. Left breast exhibits no mass, no skin change and no tenderness. Breasts are symmetrical.        Abdominal: Soft. She exhibits no distension and no mass. There is no abdominal tenderness.     Genitourinary:    Vagina and uterus normal.      Pelvic exam was performed with patient supine.   There is no rash or lesion on the right labia. There is no rash or lesion on the left labia. Uterus is not tender. Cervix is normal. Right adnexum displays no tenderness and no fullness. Left adnexum displays no tenderness and no fullness. No bleeding in the vagina. Labial bartholins normal.Cervix exhibits no motion tenderness and no friability. negative for vaginal  discharge          Musculoskeletal: Normal range of motion.      Lymphadenopathy:     She has no cervical adenopathy.    Neurological: She is oriented to person, place, and time.   Cranial Nerves II-XII grossly intact.    Skin: No rash noted. No erythema.    Psychiatric: She has a normal mood and affect. Her behavior is normal.          Assessment:        1. Encounter for gynecological examination without abnormal finding    2. Screening breast examination    3. Yeast infection                Plan:      1. Encounter for gynecological examination without abnormal finding  - Pap and HPV done today.  -   Screening tests as ordered.  - Diet and exercise encouraged.    Counseling: Perimenopause/Menopause  Stress management techniques  indications for and frequency of periodic gynecologic exam  reviewed current Pap guidelines. Explained new understanding of natural history of cervical disease and improved Paps. Recommended guideline concordant care.     - Liquid-Based Pap Smear, Screening  - HPV High Risk Genotypes, PCR    2. Screening breast examination  - Self breast exams encouraged     3. Yeast infection  - triamcinolone acetonide 0.025% (KENALOG) 0.025 % cream; Apply topically 2 (two) times daily.  Dispense: 80 g; Refill: 1  - nystatin (MYCOSTATIN) cream; Apply topically 2 (two) times daily.  Dispense: 30 g; Refill: 1      4.  Considering HRT for weight loss.  We discussed ACOG's recommendation to use hormone replacement therapy for the relief of hot flashes alone and to be on the lowest dose possible for the shortest duration. Alternative such as herbal and soy-based products were reviewed. All of her questions about this therapy were answered.

## 2020-10-26 LAB
HPV HR 12 DNA SPEC QL NAA+PROBE: NEGATIVE
HPV16 AG SPEC QL: NEGATIVE
HPV18 DNA SPEC QL NAA+PROBE: NEGATIVE

## 2020-11-13 LAB
FINAL PATHOLOGIC DIAGNOSIS: NORMAL
Lab: NORMAL

## 2021-06-02 PROCEDURE — 87502 INFLUENZA DNA AMP PROBE: CPT

## 2021-06-02 PROCEDURE — 82962 GLUCOSE BLOOD TEST: CPT

## 2021-06-02 PROCEDURE — 99285 EMERGENCY DEPT VISIT HI MDM: CPT | Mod: 25

## 2021-06-02 PROCEDURE — U0002 COVID-19 LAB TEST NON-CDC: HCPCS | Performed by: PHYSICIAN ASSISTANT

## 2021-06-03 ENCOUNTER — HOSPITAL ENCOUNTER (EMERGENCY)
Facility: HOSPITAL | Age: 56
Discharge: HOME | End: 2021-06-03
Attending: EMERGENCY MEDICINE
Payer: MEDICAID

## 2021-06-03 VITALS
TEMPERATURE: 99 F | WEIGHT: 250 LBS | BODY MASS INDEX: 37.89 KG/M2 | HEART RATE: 60 BPM | SYSTOLIC BLOOD PRESSURE: 122 MMHG | HEIGHT: 68 IN | DIASTOLIC BLOOD PRESSURE: 83 MMHG | RESPIRATION RATE: 18 BRPM | OXYGEN SATURATION: 96 %

## 2021-06-03 DIAGNOSIS — J06.9 VIRAL URI WITH COUGH: Primary | ICD-10-CM

## 2021-06-03 DIAGNOSIS — R07.89 CHEST PRESSURE: ICD-10-CM

## 2021-06-03 PROBLEM — R73.03 PREDIABETES: Status: ACTIVE | Noted: 2018-10-03

## 2021-06-03 PROBLEM — R60.0 EDEMA OF LOWER EXTREMITY: Status: ACTIVE | Noted: 2021-06-03

## 2021-06-03 PROBLEM — E03.9 HYPOTHYROIDISM: Status: ACTIVE | Noted: 2021-06-03

## 2021-06-03 PROBLEM — J32.9 CHRONIC SINUSITIS: Status: ACTIVE | Noted: 2021-06-03

## 2021-06-03 PROBLEM — B02.9 HERPES ZOSTER: Status: ACTIVE | Noted: 2021-06-03

## 2021-06-03 PROBLEM — G43.909 MIGRAINE: Status: ACTIVE | Noted: 2021-01-11

## 2021-06-03 PROBLEM — G47.33 OBSTRUCTIVE SLEEP APNEA SYNDROME: Status: ACTIVE | Noted: 2020-11-12

## 2021-06-03 PROBLEM — J01.90 ACUTE SINUSITIS: Status: ACTIVE | Noted: 2021-06-03

## 2021-06-03 PROBLEM — M62.830 SPASM OF BACK MUSCLES: Status: ACTIVE | Noted: 2021-06-03

## 2021-06-03 PROBLEM — M70.50 BURSITIS OF KNEE: Status: ACTIVE | Noted: 2018-01-24

## 2021-06-03 PROBLEM — E78.5 HYPERLIPIDEMIA: Status: ACTIVE | Noted: 2018-10-03

## 2021-06-03 PROBLEM — B37.31 CANDIDIASIS OF VAGINA: Status: ACTIVE | Noted: 2021-06-03

## 2021-06-03 LAB
ALBUMIN SERPL BCP-MCNC: 4.1 G/DL (ref 3.5–5.2)
ALP SERPL-CCNC: 111 U/L (ref 55–135)
ALT SERPL W/O P-5'-P-CCNC: 22 U/L (ref 10–44)
ANION GAP SERPL CALC-SCNC: 9 MMOL/L (ref 8–16)
AST SERPL-CCNC: 12 U/L (ref 10–40)
BASOPHILS # BLD AUTO: 0.07 K/UL (ref 0–0.2)
BASOPHILS NFR BLD: 0.6 % (ref 0–1.9)
BILIRUB SERPL-MCNC: 0.3 MG/DL (ref 0.1–1)
BUN SERPL-MCNC: 20 MG/DL (ref 6–20)
CALCIUM SERPL-MCNC: 10.5 MG/DL (ref 8.7–10.5)
CHLORIDE SERPL-SCNC: 103 MMOL/L (ref 95–110)
CO2 SERPL-SCNC: 28 MMOL/L (ref 23–29)
CREAT SERPL-MCNC: 1 MG/DL (ref 0.5–1.4)
CTP QC/QA: YES
DIFFERENTIAL METHOD: ABNORMAL
EOSINOPHIL # BLD AUTO: 0.1 K/UL (ref 0–0.5)
EOSINOPHIL NFR BLD: 0.5 % (ref 0–8)
ERYTHROCYTE [DISTWIDTH] IN BLOOD BY AUTOMATED COUNT: 13.6 % (ref 11.5–14.5)
EST. GFR  (AFRICAN AMERICAN): >60 ML/MIN/1.73 M^2
EST. GFR  (NON AFRICAN AMERICAN): >60 ML/MIN/1.73 M^2
GLUCOSE SERPL-MCNC: 100 MG/DL (ref 70–110)
HCT VFR BLD AUTO: 41.6 % (ref 37–48.5)
HGB BLD-MCNC: 13.5 G/DL (ref 12–16)
IMM GRANULOCYTES # BLD AUTO: 0.09 K/UL (ref 0–0.04)
IMM GRANULOCYTES NFR BLD AUTO: 0.8 % (ref 0–0.5)
LYMPHOCYTES # BLD AUTO: 3.6 K/UL (ref 1–4.8)
LYMPHOCYTES NFR BLD: 31.9 % (ref 18–48)
MCH RBC QN AUTO: 29.7 PG (ref 27–31)
MCHC RBC AUTO-ENTMCNC: 32.5 G/DL (ref 32–36)
MCV RBC AUTO: 91 FL (ref 82–98)
MOLECULAR STREP A: NEGATIVE
MONOCYTES # BLD AUTO: 0.7 K/UL (ref 0.3–1)
MONOCYTES NFR BLD: 6.5 % (ref 4–15)
NEUTROPHILS # BLD AUTO: 6.8 K/UL (ref 1.8–7.7)
NEUTROPHILS NFR BLD: 59.7 % (ref 38–73)
NRBC BLD-RTO: 0 /100 WBC
PLATELET # BLD AUTO: 291 K/UL (ref 150–450)
PMV BLD AUTO: 10.1 FL (ref 9.2–12.9)
POC MOLECULAR INFLUENZA A AGN: NEGATIVE
POC MOLECULAR INFLUENZA B AGN: NEGATIVE
POCT GLUCOSE: 108 MG/DL (ref 70–110)
POTASSIUM SERPL-SCNC: 4.4 MMOL/L (ref 3.5–5.1)
PROT SERPL-MCNC: 7.1 G/DL (ref 6–8.4)
RBC # BLD AUTO: 4.55 M/UL (ref 4–5.4)
SARS-COV-2 RDRP RESP QL NAA+PROBE: NEGATIVE
SARS-COV-2 RNA RESP QL NAA+PROBE: NOT DETECTED
SODIUM SERPL-SCNC: 140 MMOL/L (ref 136–145)
TROPONIN I SERPL DL<=0.01 NG/ML-MCNC: 0.01 NG/ML (ref 0–0.03)
WBC # BLD AUTO: 11.39 K/UL (ref 3.9–12.7)

## 2021-06-03 PROCEDURE — 84484 ASSAY OF TROPONIN QUANT: CPT | Performed by: PHYSICIAN ASSISTANT

## 2021-06-03 PROCEDURE — 25000003 PHARM REV CODE 250: Performed by: PHYSICIAN ASSISTANT

## 2021-06-03 PROCEDURE — 85025 COMPLETE CBC W/AUTO DIFF WBC: CPT | Performed by: PHYSICIAN ASSISTANT

## 2021-06-03 PROCEDURE — 80053 COMPREHEN METABOLIC PANEL: CPT | Performed by: PHYSICIAN ASSISTANT

## 2021-06-03 PROCEDURE — 93005 ELECTROCARDIOGRAM TRACING: CPT

## 2021-06-03 PROCEDURE — U0003 INFECTIOUS AGENT DETECTION BY NUCLEIC ACID (DNA OR RNA); SEVERE ACUTE RESPIRATORY SYNDROME CORONAVIRUS 2 (SARS-COV-2) (CORONAVIRUS DISEASE [COVID-19]), AMPLIFIED PROBE TECHNIQUE, MAKING USE OF HIGH THROUGHPUT TECHNOLOGIES AS DESCRIBED BY CMS-2020-01-R: HCPCS | Performed by: EMERGENCY MEDICINE

## 2021-06-03 PROCEDURE — 93010 EKG 12-LEAD: ICD-10-PCS | Mod: ,,, | Performed by: INTERNAL MEDICINE

## 2021-06-03 PROCEDURE — U0005 INFEC AGEN DETEC AMPLI PROBE: HCPCS | Performed by: EMERGENCY MEDICINE

## 2021-06-03 PROCEDURE — 93010 ELECTROCARDIOGRAM REPORT: CPT | Mod: ,,, | Performed by: INTERNAL MEDICINE

## 2021-06-03 RX ORDER — BENZONATATE 100 MG/1
100 CAPSULE ORAL
Status: COMPLETED | OUTPATIENT
Start: 2021-06-03 | End: 2021-06-03

## 2021-06-03 RX ORDER — HYDROCORTISONE ACETATE 25 MG/1
SUPPOSITORY RECTAL
COMMUNITY

## 2021-06-03 RX ORDER — GALCANEZUMAB 120 MG/ML
INJECTION, SOLUTION SUBCUTANEOUS
COMMUNITY

## 2021-06-03 RX ORDER — PRAVASTATIN SODIUM 20 MG/1
TABLET ORAL
COMMUNITY

## 2021-06-03 RX ORDER — METFORMIN HYDROCHLORIDE 750 MG/1
750 TABLET, EXTENDED RELEASE ORAL 2 TIMES DAILY
COMMUNITY
Start: 2021-05-19

## 2021-06-03 RX ORDER — ONDANSETRON 4 MG/1
4 TABLET, ORALLY DISINTEGRATING ORAL
Status: COMPLETED | OUTPATIENT
Start: 2021-06-03 | End: 2021-06-03

## 2021-06-03 RX ORDER — FLUTICASONE PROPIONATE 50 MCG
1 SPRAY, SUSPENSION (ML) NASAL
COMMUNITY

## 2021-06-03 RX ORDER — BUPROPION HYDROCHLORIDE 200 MG/1
TABLET, EXTENDED RELEASE ORAL
COMMUNITY

## 2021-06-03 RX ORDER — GUAIFENESIN/DEXTROMETHORPHAN 100-10MG/5
5 SYRUP ORAL 4 TIMES DAILY PRN
Qty: 120 ML | Refills: 0 | Status: SHIPPED | OUTPATIENT
Start: 2021-06-03 | End: 2021-06-13

## 2021-06-03 RX ORDER — ALBUTEROL SULFATE 90 UG/1
1-2 AEROSOL, METERED RESPIRATORY (INHALATION) EVERY 6 HOURS PRN
Qty: 18 G | Refills: 0 | Status: SHIPPED | OUTPATIENT
Start: 2021-06-03

## 2021-06-03 RX ORDER — CLOBETASOL PROPIONATE 0.46 MG/ML
SOLUTION TOPICAL
COMMUNITY

## 2021-06-03 RX ORDER — ACETAMINOPHEN 500 MG
500 TABLET ORAL
Status: COMPLETED | OUTPATIENT
Start: 2021-06-03 | End: 2021-06-03

## 2021-06-03 RX ORDER — TRAZODONE HYDROCHLORIDE 100 MG/1
TABLET ORAL
COMMUNITY

## 2021-06-03 RX ORDER — TOPIRAMATE 25 MG/1
25 TABLET ORAL 2 TIMES DAILY
COMMUNITY
Start: 2021-02-16

## 2021-06-03 RX ORDER — HYDROQUINONE 40 MG/G
CREAM TOPICAL
COMMUNITY

## 2021-06-03 RX ORDER — CITALOPRAM 40 MG/1
TABLET, FILM COATED ORAL
COMMUNITY

## 2021-06-03 RX ORDER — LEVOTHYROXINE SODIUM 137 UG/1
TABLET ORAL
COMMUNITY

## 2021-06-03 RX ORDER — PREDNISONE 10 MG/1
TABLET ORAL
COMMUNITY
Start: 2021-05-24

## 2021-06-03 RX ORDER — MELOXICAM 7.5 MG/1
TABLET ORAL
COMMUNITY

## 2021-06-03 RX ORDER — BENZONATATE 100 MG/1
100 CAPSULE ORAL 3 TIMES DAILY PRN
Qty: 20 CAPSULE | Refills: 0 | Status: SHIPPED | OUTPATIENT
Start: 2021-06-03 | End: 2021-06-13

## 2021-06-03 RX ORDER — ARIPIPRAZOLE 2 MG/1
TABLET ORAL
COMMUNITY

## 2021-06-03 RX ORDER — RIZATRIPTAN BENZOATE 10 MG/1
TABLET, ORALLY DISINTEGRATING ORAL
COMMUNITY

## 2021-06-03 RX ORDER — AZELASTINE 1 MG/ML
SPRAY, METERED NASAL
COMMUNITY

## 2021-06-03 RX ADMIN — ONDANSETRON 4 MG: 4 TABLET, ORALLY DISINTEGRATING ORAL at 02:06

## 2021-06-03 RX ADMIN — BENZONATATE 100 MG: 100 CAPSULE ORAL at 02:06

## 2021-06-03 RX ADMIN — ACETAMINOPHEN 500 MG: 500 TABLET, FILM COATED ORAL at 02:06

## 2021-08-03 ENCOUNTER — CLINICAL SUPPORT (OUTPATIENT)
Dept: REHABILITATION | Facility: HOSPITAL | Age: 56
End: 2021-08-03
Payer: MEDICAID

## 2021-08-03 DIAGNOSIS — M25.571 CHRONIC PAIN OF RIGHT ANKLE: ICD-10-CM

## 2021-08-03 DIAGNOSIS — G89.29 CHRONIC PAIN OF RIGHT ANKLE: ICD-10-CM

## 2021-08-03 DIAGNOSIS — M25.671 DECREASED RANGE OF MOTION OF RIGHT ANKLE: ICD-10-CM

## 2021-08-03 DIAGNOSIS — R29.898 DECREASED STRENGTH OF LOWER EXTREMITY: ICD-10-CM

## 2021-08-03 PROCEDURE — 97163 PT EVAL HIGH COMPLEX 45 MIN: CPT | Mod: PN

## 2021-08-03 PROCEDURE — 97110 THERAPEUTIC EXERCISES: CPT | Mod: PN

## 2021-09-06 PROBLEM — J01.90 ACUTE SINUSITIS: Status: RESOLVED | Noted: 2021-06-03 | Resolved: 2021-09-06

## 2021-09-15 ENCOUNTER — CLINICAL SUPPORT (OUTPATIENT)
Dept: REHABILITATION | Facility: HOSPITAL | Age: 56
End: 2021-09-15
Payer: MEDICAID

## 2021-09-15 DIAGNOSIS — M25.571 CHRONIC PAIN OF RIGHT ANKLE: Primary | ICD-10-CM

## 2021-09-15 DIAGNOSIS — G89.29 CHRONIC PAIN OF RIGHT ANKLE: Primary | ICD-10-CM

## 2021-09-15 DIAGNOSIS — R29.898 DECREASED STRENGTH OF LOWER EXTREMITY: ICD-10-CM

## 2021-09-15 DIAGNOSIS — M25.671 DECREASED RANGE OF MOTION OF RIGHT ANKLE: ICD-10-CM

## 2021-09-15 PROCEDURE — 97110 THERAPEUTIC EXERCISES: CPT | Mod: PN,CQ

## 2021-09-20 ENCOUNTER — CLINICAL SUPPORT (OUTPATIENT)
Dept: REHABILITATION | Facility: HOSPITAL | Age: 56
End: 2021-09-20
Payer: MEDICAID

## 2021-09-20 DIAGNOSIS — R29.898 DECREASED STRENGTH OF LOWER EXTREMITY: ICD-10-CM

## 2021-09-20 DIAGNOSIS — M25.671 DECREASED RANGE OF MOTION OF RIGHT ANKLE: ICD-10-CM

## 2021-09-20 DIAGNOSIS — M25.571 CHRONIC PAIN OF RIGHT ANKLE: Primary | ICD-10-CM

## 2021-09-20 DIAGNOSIS — G89.29 CHRONIC PAIN OF RIGHT ANKLE: Primary | ICD-10-CM

## 2021-09-20 PROCEDURE — 97110 THERAPEUTIC EXERCISES: CPT | Mod: PN,CQ

## 2021-10-08 ENCOUNTER — CLINICAL SUPPORT (OUTPATIENT)
Dept: REHABILITATION | Facility: HOSPITAL | Age: 56
End: 2021-10-08
Payer: MEDICAID

## 2021-10-08 DIAGNOSIS — G89.29 CHRONIC PAIN OF RIGHT ANKLE: ICD-10-CM

## 2021-10-08 DIAGNOSIS — R29.898 DECREASED STRENGTH OF LOWER EXTREMITY: ICD-10-CM

## 2021-10-08 DIAGNOSIS — M25.671 DECREASED RANGE OF MOTION OF RIGHT ANKLE: ICD-10-CM

## 2021-10-08 DIAGNOSIS — M25.571 CHRONIC PAIN OF RIGHT ANKLE: ICD-10-CM

## 2021-10-08 PROCEDURE — 97110 THERAPEUTIC EXERCISES: CPT | Mod: PN

## 2021-10-08 PROCEDURE — 97140 MANUAL THERAPY 1/> REGIONS: CPT | Mod: PN

## 2021-10-12 ENCOUNTER — CLINICAL SUPPORT (OUTPATIENT)
Dept: REHABILITATION | Facility: HOSPITAL | Age: 56
End: 2021-10-12
Payer: MEDICAID

## 2021-10-12 DIAGNOSIS — M25.571 CHRONIC PAIN OF RIGHT ANKLE: ICD-10-CM

## 2021-10-12 DIAGNOSIS — G89.29 CHRONIC PAIN OF RIGHT ANKLE: ICD-10-CM

## 2021-10-12 DIAGNOSIS — M25.671 DECREASED RANGE OF MOTION OF RIGHT ANKLE: ICD-10-CM

## 2021-10-12 DIAGNOSIS — R29.898 DECREASED STRENGTH OF LOWER EXTREMITY: ICD-10-CM

## 2021-10-12 PROCEDURE — 97110 THERAPEUTIC EXERCISES: CPT | Mod: PN

## 2021-10-15 ENCOUNTER — CLINICAL SUPPORT (OUTPATIENT)
Dept: REHABILITATION | Facility: HOSPITAL | Age: 56
End: 2021-10-15
Payer: MEDICAID

## 2021-10-15 DIAGNOSIS — R29.898 DECREASED STRENGTH OF LOWER EXTREMITY: ICD-10-CM

## 2021-10-15 DIAGNOSIS — M25.571 CHRONIC PAIN OF RIGHT ANKLE: ICD-10-CM

## 2021-10-15 DIAGNOSIS — M25.671 DECREASED RANGE OF MOTION OF RIGHT ANKLE: ICD-10-CM

## 2021-10-15 DIAGNOSIS — G89.29 CHRONIC PAIN OF RIGHT ANKLE: ICD-10-CM

## 2021-10-15 PROCEDURE — 97110 THERAPEUTIC EXERCISES: CPT | Mod: PN

## 2021-10-19 ENCOUNTER — CLINICAL SUPPORT (OUTPATIENT)
Dept: REHABILITATION | Facility: HOSPITAL | Age: 56
End: 2021-10-19
Payer: MEDICAID

## 2021-10-19 DIAGNOSIS — M25.571 CHRONIC PAIN OF RIGHT ANKLE: ICD-10-CM

## 2021-10-19 DIAGNOSIS — G89.29 CHRONIC PAIN OF RIGHT ANKLE: ICD-10-CM

## 2021-10-19 DIAGNOSIS — M25.671 DECREASED RANGE OF MOTION OF RIGHT ANKLE: ICD-10-CM

## 2021-10-19 DIAGNOSIS — R29.898 DECREASED STRENGTH OF LOWER EXTREMITY: ICD-10-CM

## 2021-10-19 PROCEDURE — 97110 THERAPEUTIC EXERCISES: CPT | Mod: PN

## 2021-10-22 ENCOUNTER — CLINICAL SUPPORT (OUTPATIENT)
Dept: REHABILITATION | Facility: HOSPITAL | Age: 56
End: 2021-10-22
Payer: MEDICAID

## 2021-10-22 DIAGNOSIS — M25.671 DECREASED RANGE OF MOTION OF RIGHT ANKLE: ICD-10-CM

## 2021-10-22 DIAGNOSIS — M25.571 CHRONIC PAIN OF RIGHT ANKLE: ICD-10-CM

## 2021-10-22 DIAGNOSIS — R29.898 DECREASED STRENGTH OF LOWER EXTREMITY: ICD-10-CM

## 2021-10-22 DIAGNOSIS — G89.29 CHRONIC PAIN OF RIGHT ANKLE: ICD-10-CM

## 2021-10-22 PROCEDURE — 97110 THERAPEUTIC EXERCISES: CPT | Mod: PN

## 2021-10-26 ENCOUNTER — CLINICAL SUPPORT (OUTPATIENT)
Dept: REHABILITATION | Facility: HOSPITAL | Age: 56
End: 2021-10-26
Payer: MEDICAID

## 2021-10-26 DIAGNOSIS — G89.29 CHRONIC PAIN OF RIGHT ANKLE: ICD-10-CM

## 2021-10-26 DIAGNOSIS — M25.571 CHRONIC PAIN OF RIGHT ANKLE: ICD-10-CM

## 2021-10-26 DIAGNOSIS — R29.898 DECREASED STRENGTH OF LOWER EXTREMITY: ICD-10-CM

## 2021-10-26 DIAGNOSIS — M25.671 DECREASED RANGE OF MOTION OF RIGHT ANKLE: ICD-10-CM

## 2021-10-26 PROCEDURE — 97110 THERAPEUTIC EXERCISES: CPT | Mod: PN

## 2021-10-29 ENCOUNTER — CLINICAL SUPPORT (OUTPATIENT)
Dept: REHABILITATION | Facility: HOSPITAL | Age: 56
End: 2021-10-29
Payer: MEDICAID

## 2021-10-29 DIAGNOSIS — R29.898 DECREASED STRENGTH OF LOWER EXTREMITY: ICD-10-CM

## 2021-10-29 DIAGNOSIS — M25.571 CHRONIC PAIN OF RIGHT ANKLE: ICD-10-CM

## 2021-10-29 DIAGNOSIS — G89.29 CHRONIC PAIN OF RIGHT ANKLE: ICD-10-CM

## 2021-10-29 DIAGNOSIS — M25.671 DECREASED RANGE OF MOTION OF RIGHT ANKLE: ICD-10-CM

## 2021-10-29 PROCEDURE — 97110 THERAPEUTIC EXERCISES: CPT | Mod: PN

## 2021-11-19 DIAGNOSIS — M47.896 OTHER OSTEOARTHRITIS OF SPINE, LUMBAR REGION: Primary | ICD-10-CM

## 2022-01-11 ENCOUNTER — IMMUNIZATION (OUTPATIENT)
Dept: OBSTETRICS AND GYNECOLOGY | Facility: CLINIC | Age: 57
End: 2022-01-11
Payer: MEDICAID

## 2022-01-11 DIAGNOSIS — Z23 NEED FOR VACCINATION: Primary | ICD-10-CM

## 2022-01-11 PROCEDURE — 0004A COVID-19, MRNA, LNP-S, PF, 30 MCG/0.3 ML DOSE VACCINE: CPT | Mod: PBBFAC

## 2022-02-03 ENCOUNTER — CLINICAL SUPPORT (OUTPATIENT)
Dept: REHABILITATION | Facility: HOSPITAL | Age: 57
End: 2022-02-03
Attending: FAMILY MEDICINE
Payer: MEDICAID

## 2022-02-03 DIAGNOSIS — M53.86 DECREASED RANGE OF MOTION OF LUMBAR SPINE: ICD-10-CM

## 2022-02-03 DIAGNOSIS — M62.89 MUSCLE TIGHTNESS: ICD-10-CM

## 2022-02-03 DIAGNOSIS — M62.81 MUSCLE WEAKNESS OF LOWER EXTREMITY: ICD-10-CM

## 2022-02-03 PROCEDURE — 97110 THERAPEUTIC EXERCISES: CPT | Mod: PN

## 2022-02-03 PROCEDURE — 97161 PT EVAL LOW COMPLEX 20 MIN: CPT | Mod: PN

## 2022-02-03 NOTE — PLAN OF CARE
"[facility]   Physical Therapy Initial Evaluation     Date: 2/3/2022   Name: Eduarda Casanova  Clinic Number: [id number]    Therapy Diagnosis:   Encounter Diagnoses   Name Primary?    Decreased range of motion of lumbar spine     Muscle weakness of lower extremity     Muscle tightness      Physician: Jesse May MD    Physician Orders: PT Eval and Treat   Medical Diagnosis from Referral: Other osteoarthritis of spine, lumbar region  Evaluation Date: 2/3/2022  Authorization Period Expiration: 2/26/22  Plan of Care Expiration: 5/3/22  Progress Note Due: 3/3/22  Visit # / Visits authorized: 1/ 1   FOTO: 1/3    Precautions: Diabetes     Time In: 0945  Time Out: 1030  Total Appointment Time (timed & untimed codes): 45 minutes    SUBJECTIVE     Date of onset: insidious about 3 weeks    History of current condition - Maria Esther reports: long history of low back pain for about 5-10 years. About 3 weeks ago, she was being examined for her back pain and noticed she couldn't lift her L leg to cross over her R knee without use of hands.  She took medication that has greatly improved her symptoms. Her pain is mostly in L low back and R hip. She is being managed for a possible autoimmune disease after diagnosis of alopecia and losing her hair in March 2021. No radicular symptoms into her LEs but her hands will occasionally go numb with activities. She has pain between her shoulder blades and has frequent headaches. Pt sleeps mostly on back and side since she has to use a BiPAP machine. She has to care for 96 yo father and dying dog. She is feeling stressed.     Falls: none    Imaging: x-ray for lumbar on 1/28/22- "stepwise grad 1 retrolisthesis from T11-L5. B pars defect at L5-S1 with 7 mm anterolisthesis. 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  Prior Level of Function: independent with all ADLs, currently driving  Current Level of Function: difficulty with prolonged sitting and standing, walking long distances, work duties, lifting, caring for father and dying dog, bending over, squatting    Pain:  Current 5/10, worst 10/10, best 5/10   Location: L low back  Description: Burning, Tight and Sharp  Aggravating Factors: Sitting, Standing, Bending, Walking and Lifting  Easing Factors: massage, lying down and rest   Radicular symptoms: none into LEs but occasional into hands  Pain with cough/sneeze, changes in B&B, sleep disturbance: denies B&B changes, sleep is disturbed due to pain, increase in pain with cough/sneeze    Patients goals: to be able to go to the gym and loss weight     Medical History:   Past Medical History:   Diagnosis Date    Alopecia     Depression     Diabetes mellitus     Thyroid disease        Surgical History:   Eduarda Casanova  has a past surgical history that includes [place] tooth extraction; Ankle surgery; and Sinus surgery.    Medications:   Eduarda MCCULLOUGH has a current medication list which includes the following prescription(s): albuterol, aripiprazole, aripiprazole, azelastine, bupropion, bupropion, citalopram, citalopram, clobetasol, flucelvax quad [id number] (pf), fluticasone propionate, emgality syringe, hydrocortisone, hydroquinone, levothyroxine, levothyroxine, meloxicam, metformin, nystatin, pravastatin, prednisone, rizatriptan, topiramate, trazodone, trazodone, and triamcinolone acetonide 0.025%.    Allergies:   Review of patient's allergies indicates:   Allergen Reactions    Demerol [meperidine]     Dilaudid [hydromorphone]     Divalproex     Venom-wasp      OBJECTIVE     Observation: pleasant and cooperative    Posture: Dowager's hump, increased thoracic kyphosis, increased lumbar  lordosis    Lumbar Range of Motion:    %   Flexion 50     Extension 100 thoracic pain     Left Side Bending 100   Right Side Bending 50 L low back pain   Left rotation   50   Right Rotation   50     Passive hip ROM in degrees: B IR and ER WNL    Lower Extremity Strength    Right LE  Left LE    Hip flexion: 4+/5 Hip flexion: 4+/5   Knee extension: 5/5 Knee extension: 5/5   Knee flexion: 5/5 Knee flexion: 5/5   Hip extension:  5/5 Hip extension: 4-/5   Hip abduction: 5/5 Hip abduction: 4/5   Hip adduction: 4/5 Hip adduction 4/5   Ankle dorsiflexion: 5/5 Ankle dorsiflexion: 5/5     Special Tests:  -Bridge Test: positive for pain    Neuro Dynamic Testing:    Sciatic nerve:      SLR: B negative   Neural Tension:     Slump test: B negative    Palpation: L QL and lumbar paraspinals TTP, increased B lumbar paraspinal muscle tone    Flexibility:    Ely's test: R = 120 degrees ; L = 120 degrees   Popliteal Angle: B WNL    Limitation/Restriction for FOTO lumbar spine Survey    Therapist reviewed FOTO scores for Eduarda Casanova on 2/3/2022.   FOTO documents entered into EPIC - see Media section.    Limitation Score: 60%     TREATMENT     Total Treatment time (time-based codes) separate from Evaluation: 10 minutes     Maria Esther received the treatments listed below:      Therapeutic exercises to develop strength, endurance, ROM, flexibility, posture and core stabilization for 10 minutes including:    SL QL stretch demo  Seated lateral trunk lean stretch demo  Open books demo    PATIENT EDUCATION AND HOME EXERCISES     Education provided:   - importance of performing HEP to tolerance    Written Home Exercises Provided: yes. Exercises were reviewed and Maria Esther was able to demonstrate them prior to the end of the session.  Maria Esther demonstrated good  understanding of the education provided. See EMR under Patient Instructions for exercises provided during therapy sessions.    ASSESSMENT     Eduarda MCCULLOUGH is a 56 y.o. female referred to  outpatient Physical Therapy with a medical diagnosis of Other osteoarthritis of spine, lumbar region. Patient presents with reports of L low back pain, muscle tightness, LE weakness, postural imbalance, decreased lumbar AROM, tenderness in L QL and paraspinals, and decreased functional mobility. HEP provided.     Patient prognosis is Good to fair.   Patientt will benefit from skilled outpatient Physical Therapy to address the deficits stated above and in the chart below, provide patient /family education, and to maximize patientt's level of independence.     Plan of care discussed with patient: Yes  Patient's spiritual, cultural and educational needs considered and patient is agreeable to the plan of care and goals as stated below:     Anticipated Barriers for therapy: chronicity of condition, stress management    Medical Necessity is demonstrated by the following  History  Co-morbidities and personal factors that may impact the plan of care Co-morbidities:   coping style/mechanism, diabetes, high BMI and possible autoimmune disease, alopecia    Personal Factors:   lifestyle     high   Examination  Body Structures and Functions, activity limitations and participation restrictions that may impact the plan of care Body Regions:   back  lower extremities  trunk    Body Systems:    gross symmetry  ROM  strength  gross coordinated movement  transfers  transitions  motor control  motor learning    Participation Restrictions:   ADLs, IADLs, domestic and work duties    Activity limitations:   Learning and applying knowledge  no deficits    General Tasks and Commands  no deficits    Communication  no deficits    Mobility  lifting and carrying objects  walking  driving (bike, car, motorcycle)    Self care  no deficits    Domestic Life  shopping  cooking  doing house work (cleaning house, washing dishes, laundry)  assisting others    Interactions/Relationships  no deficits    Life Areas  employment    Community and Social  Life  community life  recreation and leisure         high   Clinical Presentation stable and uncomplicated low   Decision Making/ Complexity Score: low     Medical necessity is demonstrated by the following IMPAIRMENTS/PROBLEM LIST:   1) Increase in pain level limiting function   2) LE weakness   3) Decreased lumbar AROM   4) Difficulty walking long distances   5) Lack of HEP    GOALS: Short Term Goals:  6 weeks in progress  1. Report decreased L low back pain  <  / =  5/10 at worst to increase tolerance for sleep.  2. Pt will be able to tolerate multi-directional LE strengthening in order to improve ability to perform household chores.  3. Pt will increase lumbar flexion AROM to 100% to indicate improved flexibility.   4. Pt will report 50% improvement in ability to walk long distances since start of care to indicate improved functional mobility.   5. Pt to tolerate HEP to improve ROM and independence with ADL's.    Long Term Goals: 12 weeks in progress  1. Report decreased L low back pain  <  / =  3/10 at worst to increase tolerance for sleep.  2. Pt will be able to perform 2 x 10 multi-directional LE strengthening without fatigue in order to improve ability to perform household chores.  3. Pt will increase lumbar R SB AROM to 100% to indicate improved flexibility.   4. Pt will report 80% improvement in ability to walk long distances since start of care to indicate improved functional mobility.   5. Pt to be Independent with HEP to improve ROM and independence with ADL's.    PLAN   Plan of care Certification: 2/3/2022 to 5/3/22.    Outpatient Physical Therapy 2 times weekly for 12 weeks to include the following interventions: Cervical/Lumbar Traction, Manual Therapy, Moist Heat/ Ice, Neuromuscular Re-ed, Patient Education, Therapeutic Activities, Therapeutic Exercise and dry needling.     Nataliya Carrasco, PT      I CERTIFY THE NEED FOR THESE SERVICES FURNISHED UNDER THIS PLAN OF TREATMENT AND WHILE UNDER MY CARE    Physician's comments:     Physician's Signature: ___________________________________________________

## 2022-02-22 ENCOUNTER — CLINICAL SUPPORT (OUTPATIENT)
Dept: REHABILITATION | Facility: HOSPITAL | Age: 57
End: 2022-02-22
Attending: FAMILY MEDICINE
Payer: MEDICAID

## 2022-02-22 DIAGNOSIS — M62.89 MUSCLE TIGHTNESS: ICD-10-CM

## 2022-02-22 DIAGNOSIS — M62.81 MUSCLE WEAKNESS OF LOWER EXTREMITY: ICD-10-CM

## 2022-02-22 DIAGNOSIS — M53.86 DECREASED RANGE OF MOTION OF LUMBAR SPINE: Primary | ICD-10-CM

## 2022-02-22 PROCEDURE — 97110 THERAPEUTIC EXERCISES: CPT | Mod: PN

## 2022-02-22 NOTE — PROGRESS NOTES
[facility]   Physical Therapy Treatment Note     Name: Eduarda Casanova  Clinic Number: [id number]    Therapy Diagnosis:   Encounter Diagnoses   Name Primary?    Decreased range of motion of lumbar spine Yes    Muscle weakness of lower extremity     Muscle tightness      Physician: Jesse May III, MD    Visit Date: 2/22/2022  Physician Orders: PT Eval and Treat   Medical Diagnosis from Referral: Other osteoarthritis of spine, lumbar region  Evaluation Date: 2/3/2022  Authorization Period Expiration: 2/26/22  Plan of Care Expiration: 5/3/22  Progress Note Due: 3/3/22  Visit # / Visits authorized: 1/ 1   FOTO: 1/3     Time In: 1250  Time Out: 1:30  Total Appointment Time (timed & untimed codes): 40 minutes( 3TE)    Precautions: Diabetes     SUBJECTIVE     Pt reports: Pt reports that her back is still giving her a lot of trouble. But she went home today and laid down for an hour and that always helps. She has had 4 deep tissue massages since she was last here which help. Pt states that she was told she was very tight. Pt is being sent for 2 MRIs and an xray by neurologist. Pt reports numbness and tingling in both of her arms that is what she is getting the MRI is for. Pt states that by the evening her pain will double and she will have to take medication. Pt had a toradol shot in her hip recently as well.   She was partially compliant with home exercise program.  Response to previous treatment: slight improvement, last was eval  Functional change: no change     Pain: 4/10  Location: midline and L low back       OBJECTIVE     Objective Measures updated at progress report unless specified.       SFMA FN: functional, nonpainful. FP: functional, painful. DP: dysfunctional, painful. DN: dysfunctional, nonpainful.   multi-segmental flexion  DP   multi-segmental extension FN   multi-segmental rotation  R: DN  L: DP     Multi-segmental Flexion Breakout     Stability/Motor Control Mobility  "  Long Sitting Test  x   Active SLR Test (70')  x   Stabilize ASLR Test (70') X - greatly improved     Passive SLR Test (80') X - full    Supine Knee to Chest (120')  X - soft tissue restrictions    Prone Rocking Test   X - lack of lumbar flexion        Treatment     Maria Esther received the treatments listed below:      Therapeutic exercises to develop strength, endurance, ROM, flexibility, posture and core stabilization for 40 minutes including tests and measure performed above:    DKTC c swiss ball - 2x20  Crunch with hands sliding up thighs - 2x15  hooklying TrA c manual cuing - 10x10"  Seated lumbar flexion roll out - 2x15  Squat - 2x15  Standing hip abd RTB - x15 B    NEXT - maybe dry needling     Patient Education and Home Exercises     Home Exercises Provided and Patient Education Provided     Education provided:   - Pt educated on POC  - Pt educated on anatomy and physiology of current conditions as it relates to signs and symptoms  - Pt educated on HEP     Written Home Exercises Provided: Patient instructed to cont prior HEP. Exercises were reviewed and Maria Esther was able to demonstrate them prior to the end of the session.  Maria Esther demonstrated good  understanding of the education provided. See EMR under Patient Instructions for exercises provided during therapy sessions    ASSESSMENT     Pt presents to PT for first visit after evaluation. Pt has subjectively improved pain but continues to demonstrated reduced multisegmental lumbar flexion and core weakness. Pt had good unloaded lumbar rotation but pain with loaded lumbar rotation most likely 2/2 motor control deficits. Pt challenged today with lumbar flexion mobility and core strengthening.     Maria Esther Is progressing well towards her goals.   Pt prognosis is Fair.     Pt will continue to benefit from skilled outpatient physical therapy to address the deficits listed in the problem list box on initial evaluation, provide pt/family education and to maximize pt's level " of independence in the home and community environment.     Pt's spiritual, cultural and educational needs considered and pt agreeable to plan of care and goals.     Anticipated barriers to physical therapy: currently having multiple passive tx at other locations    Goals:   Short Term Goals:  6 weeks in progress  1. Report decreased L low back pain  <  / =  5/10 at worst to increase tolerance for sleep.  2. Pt will be able to tolerate multi-directional LE strengthening in order to improve ability to perform household chores.  3. Pt will increase lumbar flexion AROM to 100% to indicate improved flexibility.   4. Pt will report 50% improvement in ability to walk long distances since start of care to indicate improved functional mobility.   5. Pt to tolerate HEP to improve ROM and independence with ADL's.     Long Term Goals: 12 weeks in progress  1. Report decreased L low back pain  <  / =  3/10 at worst to increase tolerance for sleep.  2. Pt will be able to perform 2 x 10 multi-directional LE strengthening without fatigue in order to improve ability to perform household chores.  3. Pt will increase lumbar R SB AROM to 100% to indicate improved flexibility.   4. Pt will report 80% improvement in ability to walk long distances since start of care to indicate improved functional mobility.   5. Pt to be Independent with HEP to improve ROM and independence with ADL's.       PLAN     Continue with POC as indicated. Possible candidate for dry needling at upcoming visit.     Anu Lang, PT, DPT, OCS, Cert. DN

## 2022-02-24 ENCOUNTER — CLINICAL SUPPORT (OUTPATIENT)
Dept: REHABILITATION | Facility: HOSPITAL | Age: 57
End: 2022-02-24
Attending: FAMILY MEDICINE
Payer: MEDICAID

## 2022-02-24 DIAGNOSIS — M53.86 DECREASED RANGE OF MOTION OF LUMBAR SPINE: Primary | ICD-10-CM

## 2022-02-24 DIAGNOSIS — M62.81 MUSCLE WEAKNESS OF LOWER EXTREMITY: ICD-10-CM

## 2022-02-24 DIAGNOSIS — M62.89 MUSCLE TIGHTNESS: ICD-10-CM

## 2022-02-24 PROCEDURE — 97110 THERAPEUTIC EXERCISES: CPT | Mod: PN

## 2022-02-24 NOTE — PROGRESS NOTES
[facility]   Physical Therapy Treatment Note     Name: Eduarda Casanova  Clinic Number: [id number]    Therapy Diagnosis:   Encounter Diagnoses   Name Primary?    Decreased range of motion of lumbar spine Yes    Muscle weakness of lower extremity     Muscle tightness      Physician: Jesse May III, MD    Visit Date: 2/24/2022  Physician Orders: PT Eval and Treat   Medical Diagnosis from Referral: Other osteoarthritis of spine, lumbar region  Evaluation Date: 2/3/2022  Authorization Period Expiration: 2/26/22  Plan of Care Expiration: 5/3/22  Progress Note Due: 3/3/22  Visit # / Visits authorized: 3/ 12   FOTO: 3/5     Time In: 12:15  Time Out: 1:00  Total Appointment Time (timed & untimed codes): 45 minutes( 3TE)    Precautions: Diabetes     SUBJECTIVE     Pt reports: Pt states that she is feeling better since last visit. She slept really well last night and was even able to sleep in this morning. She is very interested in getting dry needling performed today.     She was partially compliant with home exercise program.  Response to previous treatment: slight improvement, last was eval  Functional change: no change     Pain: 2/10  Location: midline and L low back       OBJECTIVE     Objective Measures updated at progress report unless specified.     2/22/2022  SFMA FN: functional, nonpainful. FP: functional, painful. DP: dysfunctional, painful. DN: dysfunctional, nonpainful.   multi-segmental flexion  DP   multi-segmental extension FN   multi-segmental rotation  R: DN  L: DP     Multi-segmental Flexion Breakout     Stability/Motor Control Mobility   Long Sitting Test  x   Active SLR Test (70')  x   Stabilize ASLR Test (70') X - greatly improved     Passive SLR Test (80') X - full    Supine Knee to Chest (120')  X - soft tissue restrictions    Prone Rocking Test   X - lack of lumbar flexion        Treatment     Maria Esther received the treatments listed below:      Therapeutic exercises  "to develop strength, endurance, ROM, flexibility, posture and core stabilization for 25 minutes including tests and measure performed above:    DKTC c swiss ball - 2x20  Crunch with hands sliding up thighs - 2x15  hooklying TrA c manual cuing - 10x10"  Seated lumbar flexion roll out - 2x15    OOT:  Standing hip abd RTB - x15 bilateral  Squat - 2x15    Maria Esther received the following manual therapy techniques: Manual traction, Myofacial release and Soft tissue Mobilization were applied to the: low back for 20 minutes, including:    Pt was agreeable to dry needling by a certified dry needling PT and signed a consent prior to treatment after being made aware of risks.  · 50mm needles were inserted into lumbar paraspinals and multifidi  · Electrical stimulation was applied to these needles at 2 Hz and a pulse of 250 µseconds for 10 minutes with a alf check.   · Afterwards, needles were removed and placed into a sharps container. Pt reported a good response to treatment.     Patient Education and Home Exercises     Home Exercises Provided and Patient Education Provided     Education provided:   - Pt educated on POC  - Pt educated on anatomy and physiology of current conditions as it relates to signs and symptoms  - Pt educated on HEP   - pt provided with paperwork regarding dry needling, see media for consent form.   - pt educated to on post tx care and to call if there are any issues.     Written Home Exercises Provided: Patient instructed to cont prior HEP. Exercises were reviewed and Maria Esther was able to demonstrate them prior to the end of the session.  Maria Esther demonstrated good  understanding of the education provided. See EMR under Patient Instructions for exercises provided during therapy sessions    ASSESSMENT     Pt presented to PT today with improvement in LBP. She continues to demonstrate lumbar flexion mobility deficits. Dry needling was performed today and pt demonstrated a good rhythmical contraction response " with stimulation. No adverse reactions noted. Pt instructed on post tx care. Pt to be monitored for response to tx.     Maria Esther Is progressing well towards her goals.   Pt prognosis is Fair.     Pt will continue to benefit from skilled outpatient physical therapy to address the deficits listed in the problem list box on initial evaluation, provide pt/family education and to maximize pt's level of independence in the home and community environment.     Pt's spiritual, cultural and educational needs considered and pt agreeable to plan of care and goals.     Anticipated barriers to physical therapy: currently having multiple passive tx at other locations    Goals:   Short Term Goals:  6 weeks in progress  1. Report decreased L low back pain  <  / =  5/10 at worst to increase tolerance for sleep.  2. Pt will be able to tolerate multi-directional LE strengthening in order to improve ability to perform household chores.  3. Pt will increase lumbar flexion AROM to 100% to indicate improved flexibility.   4. Pt will report 50% improvement in ability to walk long distances since start of care to indicate improved functional mobility.   5. Pt to tolerate HEP to improve ROM and independence with ADL's.     Long Term Goals: 12 weeks in progress  1. Report decreased L low back pain  <  / =  3/10 at worst to increase tolerance for sleep.  2. Pt will be able to perform 2 x 10 multi-directional LE strengthening without fatigue in order to improve ability to perform household chores.  3. Pt will increase lumbar R SB AROM to 100% to indicate improved flexibility.   4. Pt will report 80% improvement in ability to walk long distances since start of care to indicate improved functional mobility.   5. Pt to be Independent with HEP to improve ROM and independence with ADL's.       PLAN     Continue with POC as indicated.    Anu Lang, PT, DPT, OCS, Cert. DN

## 2022-03-03 ENCOUNTER — CLINICAL SUPPORT (OUTPATIENT)
Dept: REHABILITATION | Facility: HOSPITAL | Age: 57
End: 2022-03-03
Attending: FAMILY MEDICINE
Payer: MEDICAID

## 2022-03-03 DIAGNOSIS — M62.81 MUSCLE WEAKNESS OF LOWER EXTREMITY: ICD-10-CM

## 2022-03-03 DIAGNOSIS — M53.86 DECREASED RANGE OF MOTION OF LUMBAR SPINE: Primary | ICD-10-CM

## 2022-03-03 DIAGNOSIS — M62.89 MUSCLE TIGHTNESS: ICD-10-CM

## 2022-03-03 PROCEDURE — 97110 THERAPEUTIC EXERCISES: CPT | Mod: PN

## 2022-03-03 NOTE — PROGRESS NOTES
"[facility]   Physical Therapy Treatment Note     Name: Eduarda Casanova  Clinic Number: [id number]    Therapy Diagnosis:   Encounter Diagnoses   Name Primary?    Decreased range of motion of lumbar spine Yes    Muscle weakness of lower extremity     Muscle tightness      Physician: Jesse May III, MD    Visit Date: 3/3/2022  Physician Orders: PT Eval and Treat   Medical Diagnosis from Referral: Other osteoarthritis of spine, lumbar region  Evaluation Date: 2/3/2022  Authorization Period Expiration: 2/26/22  Plan of Care Expiration: 5/3/22  Progress Note Due: 3/3/22  Visit # / Visits authorized: 4/12   FOTO: 4/5 NEXT     Time In: 9:50am  Time Out: 10:45am  Total Appointment Time (timed & untimed codes): 55 minutes( 4TE)    Precautions: Diabetes     SUBJECTIVE     Pt reports: pt reports that her low back has been feeling better, its not perfect, but its better. Pt points to tension along lower thoracic/upper lumbar region. Pt states that she still has to lay down to "take weight off" and rests for 20min at a time if she has been standing for too long. Pt states dry needling helped directly after. She reports that she had discomfort later that night. She is curious if we can needling slightly higher in her back.     She was partially compliant with home exercise program.  Response to previous treatment: continued improvement  Functional change: no change     Pain: 1/10  Location: midline and L low back       OBJECTIVE     Objective Measures updated at progress report unless specified.     2/22/2022  SFMA FN: functional, nonpainful. FP: functional, painful. DP: dysfunctional, painful. DN: dysfunctional, nonpainful.   multi-segmental flexion  DP   multi-segmental extension FN   multi-segmental rotation  R: DN  L: DP     Multi-segmental Flexion Breakout     Stability/Motor Control Mobility   Long Sitting Test  x   Active SLR Test (70')  x   Stabilize ASLR Test (70') X - greatly " "improved     Passive SLR Test (80') X - full    Supine Knee to Chest (120')  X - soft tissue restrictions    Prone Rocking Test   X - lack of lumbar flexion      3/3/2022 - overall motion same as above  Joint mobility: Good mobility in thoracic spine   Palpation: increased tone in thoracic and lumbar regions    Treatment     Maria Esther received the treatments listed below:      Therapeutic exercises to develop strength, endurance, ROM, flexibility, posture and core stabilization for 55 minutes including tests and measure performed above:    Prone superman arms only, followed by legs only - 2x10  DKTC c swiss ball - 2x20  Crunch with hands sliding up thighs - 2x15  hooklying TrA c manual cuing - [id number]"  hooklying TrA c march 2x10  Seated lumbar flexion roll out - 2x15  Standing hip abd RTB - x15 bilateral - OOT  Squat - 2x15  Patient Education    Maria Esther received the following manual therapy techniques: Manual traction, Myofacial release and Soft tissue Mobilization were applied to the: low back for 00 minutes, including:    Pt was agreeable to dry needling by a certified dry needling PT and signed a consent prior to treatment after being made aware of risks.  · 50mm needles were inserted into lumbar paraspinals and multifidi  · Electrical stimulation was applied to these needles at 2 Hz and a pulse of 250 µseconds for 10 minutes with a longterm check.   · Afterwards, needles were removed and placed into a sharps container. Pt reported a good response to treatment.     Patient Education and Home Exercises     Home Exercises Provided and Patient Education Provided     Education provided:   - Pt educated on POC  - Pt educated on anatomy and physiology of current conditions as it relates to signs and symptoms  - Pt educated on HEP   - Pt educated extensively on manual therapy vs exercise     Written Home Exercises Provided: Patient instructed to cont prior HEP. Exercises were reviewed and Maria Esther was able to demonstrate them " prior to the end of the session.  Maria Esther demonstrated good  understanding of the education provided. See EMR under Patient Instructions for exercises provided during therapy sessions    ASSESSMENT     Pt presents to PT in slightly less pain than previous visit. Further strengthening was performed today in order facilitate active treatment and pt was educated on difference between active and passive treatment and the purpose of DN to supplement active tx. Pt continues to demonstrate poor lumbar mobility and poor motor control. Further strengthening was added today to supplement postural adjustments to reduce strain on lumbar region.     Maria Esther Is progressing well towards her goals.   Pt prognosis is Fair.     Pt will continue to benefit from skilled outpatient physical therapy to address the deficits listed in the problem list box on initial evaluation, provide pt/family education and to maximize pt's level of independence in the home and community environment.     Pt's spiritual, cultural and educational needs considered and pt agreeable to plan of care and goals.     Anticipated barriers to physical therapy: currently having multiple passive tx at other locations    Goals:   Short Term Goals:  6 weeks in progress  1. Report decreased L low back pain  <  / =  5/10 at worst to increase tolerance for sleep.  2. Pt will be able to tolerate multi-directional LE strengthening in order to improve ability to perform household chores.  3. Pt will increase lumbar flexion AROM to 100% to indicate improved flexibility.   4. Pt will report 50% improvement in ability to walk long distances since start of care to indicate improved functional mobility.   5. Pt to tolerate HEP to improve ROM and independence with ADL's.     Long Term Goals: 12 weeks in progress  1. Report decreased L low back pain  <  / =  3/10 at worst to increase tolerance for sleep.  2. Pt will be able to perform 2 x 10 multi-directional LE strengthening without  fatigue in order to improve ability to perform household chores.  3. Pt will increase lumbar R SB AROM to 100% to indicate improved flexibility.   4. Pt will report 80% improvement in ability to walk long distances since start of care to indicate improved functional mobility.   5. Pt to be Independent with HEP to improve ROM and independence with ADL's.       PLAN     Continue with POC as indicated.    Anu Lang, PT, DPT, OCS, Cert. DN

## 2022-03-09 ENCOUNTER — CLINICAL SUPPORT (OUTPATIENT)
Dept: REHABILITATION | Facility: HOSPITAL | Age: 57
End: 2022-03-09
Attending: FAMILY MEDICINE
Payer: MEDICAID

## 2022-03-09 DIAGNOSIS — M62.89 MUSCLE TIGHTNESS: ICD-10-CM

## 2022-03-09 DIAGNOSIS — M62.81 MUSCLE WEAKNESS OF LOWER EXTREMITY: ICD-10-CM

## 2022-03-09 DIAGNOSIS — M53.86 DECREASED RANGE OF MOTION OF LUMBAR SPINE: Primary | ICD-10-CM

## 2022-03-09 PROCEDURE — 97110 THERAPEUTIC EXERCISES: CPT | Mod: PN

## 2022-03-09 NOTE — PROGRESS NOTES
[facility]   Physical Therapy Treatment Note     Name: Eduarda Casanova  Clinic Number: [id number]    Therapy Diagnosis:   Encounter Diagnoses   Name Primary?    Decreased range of motion of lumbar spine Yes    Muscle weakness of lower extremity     Muscle tightness      Physician: Jesse May III, MD    Visit Date: 3/9/2022  Physician Orders: PT Eval and Treat   Medical Diagnosis from Referral: Other osteoarthritis of spine, lumbar region  Evaluation Date: 2/3/2022  Authorization Period Expiration: 2/26/22  Plan of Care Expiration: 5/3/22  Progress Note Due: 3/3/22  Visit # / Visits authorized: 5/12   FOTO: 5/5 NEXT     Time In: 0945 am  Time Out: 1030 am  Total Appointment Time (timed & untimed codes): 45 minutes( 3TE)    Precautions: Diabetes     SUBJECTIVE     Pt reports: her lower back is feeling much better, her current biggest complaint is related to her hand numbness which she reported began coming intermittently at the same time as her low back pain   She was partially compliant with home exercise program.  Response to previous treatment: continued improvement  Functional change: no change     Pain: 1/10  Location: midline and L low back       OBJECTIVE     Objective Measures updated at progress report unless specified.     2/22/2022  SFMA FN: functional, nonpainful. FP: functional, painful. DP: dysfunctional, painful. DN: dysfunctional, nonpainful.   multi-segmental flexion  DP   multi-segmental extension FN   multi-segmental rotation  R: DN  L: DP     Multi-segmental Flexion Breakout     Stability/Motor Control Mobility   Long Sitting Test  x   Active SLR Test (70')  x   Stabilize ASLR Test (70') X - greatly improved     Passive SLR Test (80') X - full    Supine Knee to Chest (120')  X - soft tissue restrictions    Prone Rocking Test   X - lack of lumbar flexion      3/3/2022 - overall motion same as above  Joint mobility: Good mobility in thoracic spine   Palpation:  "increased tone in thoracic and lumbar regions    3/9/22  Reflexes:   Biceps:3+   Brachioradialis: 3+   Triceps: 3+    Patellar:3+   Achilles: 2+  UMN Reflexes:   Salas's: slight response on R, non-clinically relevant   Babinski: subtle toe movement, non-clinically relevant    - Slump Test: inconsistent symptomology  - Repeated Flexion: increased all pain     Treatment     Maria Esther received the treatments listed below:      Therapeutic exercises to develop strength, endurance, ROM, flexibility, posture and core stabilization for 45 minutes including tests and measure performed above:    Endurance training with all extremities for reciprocal motion of 4 limbs on sci-fit x 6 min at level 3.0 at > or equal to 50 spm w/o rest.   Prone superman arms only, followed by legs only - 2x10  DKTC c swiss ball - 2x20  Repeated flexion in sitting; 25x  Crunch with hands sliding up thighs - 5 x 5 w/ 3" hold  hooklying TrA c manual cuing - [id number]"  hooklying TrA c march 2x10  Seated lumbar flexion roll out - 2x15  Standing hip abd RTB - x15 bilateral - OOT  Squat - 2x15    Chin tucks; 20x w/ 5'hold  Scap retractions in sitting; 20x w/5" hold  Wall posture; 3 x 30"   Patient Education    Maria Esther received the following manual therapy techniques: Manual traction, Myofacial release and Soft tissue Mobilization were applied to the: low back for 00 minutes, including:    Pt was agreeable to dry needling by a certified dry needling PT and signed a consent prior to treatment after being made aware of risks.  · 50mm needles were inserted into lumbar paraspinals and multifidi  · Electrical stimulation was applied to these needles at 2 Hz and a pulse of 250 µseconds for 10 minutes with a [facility] check.   · Afterwards, needles were removed and placed into a sharps container. Pt reported a good response to treatment.     Patient Education and Home Exercises     Home Exercises Provided and Patient Education Provided     Education provided:   - Pt " educated on POC  - Pt educated on anatomy and physiology of current conditions as it relates to signs and symptoms  - Pt educated on HEP   - Pt educated extensively on manual therapy vs exercise     Written Home Exercises Provided: Patient instructed to cont prior HEP. Exercises were reviewed and Maria Esther was able to demonstrate them prior to the end of the session.  Maria Esther demonstrated good  understanding of the education provided. See EMR under Patient Instructions for exercises provided during therapy sessions    ASSESSMENT   Maria Esther presents with decreased low back pain and increased complaints of B hand numbness and tingling. This was intermittently improved with postural corrections however she has very poor endurance in her parascapular musculature and was unable to maintain good form in this consistently. She has mixed hyper and hyporeflexa but upper motor neuron testing appeared negative. She received series of MRI's recently but this information has not yet been shared with our office. Will continued to progress core and glute strengthening to maintain low back improvements while monitoring overall posture and UE symptomology if referral is needed in the future.     Maria Esther Is progressing well towards her goals.   Pt prognosis is Fair.     Pt will continue to benefit from skilled outpatient physical therapy to address the deficits listed in the problem list box on initial evaluation, provide pt/family education and to maximize pt's level of independence in the home and community environment.     Pt's spiritual, cultural and educational needs considered and pt agreeable to plan of care and goals.     Anticipated barriers to physical therapy: currently having multiple passive tx at other locations    Goals:   Short Term Goals:  6 weeks in progress  1. Report decreased L low back pain  <  / =  5/10 at worst to increase tolerance for sleep.  2. Pt will be able to tolerate multi-directional LE strengthening in order to  improve ability to perform household chores.  3. Pt will increase lumbar flexion AROM to 100% to indicate improved flexibility.   4. Pt will report 50% improvement in ability to walk long distances since start of care to indicate improved functional mobility.   5. Pt to tolerate HEP to improve ROM and independence with ADL's.     Long Term Goals: 12 weeks in progress  1. Report decreased L low back pain  <  / =  3/10 at worst to increase tolerance for sleep.  2. Pt will be able to perform 2 x 10 multi-directional LE strengthening without fatigue in order to improve ability to perform household chores.  3. Pt will increase lumbar R SB AROM to 100% to indicate improved flexibility.   4. Pt will report 80% improvement in ability to walk long distances since start of care to indicate improved functional mobility.   5. Pt to be Independent with HEP to improve ROM and independence with ADL's.       PLAN     Continue with POC as indicated.    Blanca Sifuentes, PT, DPT

## 2022-03-11 NOTE — PROGRESS NOTES
[facility]   Physical Therapy Treatment Note     Name: Eduarda Casanova  Clinic Number: [id number]    Therapy Diagnosis:   Encounter Diagnoses   Name Primary?    Decreased range of motion of lumbar spine Yes    Muscle weakness of lower extremity     Muscle tightness      Physician: Jesse May III, MD    Visit Date: 3/14/2022     Physician Orders: PT Eval and Treat   Medical Diagnosis from Referral: Other osteoarthritis of spine, lumbar region  Evaluation Date: 2/3/2022  Authorization Period Expiration: 2/26/22  Plan of Care Expiration: 5/3/22  Progress Note Due: 4/14/22  Visit # / Visits authorized: 6/12   FOTO: 5/5 NEXT     Time In: 1137- pt 6 min late to session  Time Out: 1236  Total Billable Time: 59 minutes    Precautions: Diabetes     SUBJECTIVE     Pt reports: she is feeling better. Pt continues to get numbness into hands especially when peeling crawfish. Low back pain is at worst 5/10 at the end of the day. 60-70% improvement in ability to walk long distances since start of care. She had recent MRIs on spine that she does not have access too yet due to changes in email.   She was partially compliant with home exercise program.  Response to previous treatment: good with no soress  Functional change: able to walk more    Pain: 1/10  Location: midline and L low back       OBJECTIVE     Objective Measures updated at progress report unless specified.     Treatment     Maria Esther received the treatments listed below:      Therapeutic exercises to develop strength, endurance, ROM, flexibility, posture and core stabilization for 59 minutes including tests and measure performed above:    +UBE 2'F/2'B level 2  +Supine sh flex w dowel + towel roll btw scaps x 20  +Scalenes stretch w overpressure 3 x 30 sec ea  +Supine wrist ext stretch 3 x 30 sec ea  +Supine pec stretch over half roll x 3 min  +Corner stretch 3 x 30 sec  Prone superman arms only, followed by legs only - 2x10  DKTC c  "swiss ball - 2x20  Repeated flexion in sitting; 25x  Crunch with hands sliding up thighs - 5 x 5 w/ 3" hold  hooklying TrA c manual cuing - [id number]"  hooklying TrA c march 2x10  Seated lumbar flexion roll out - 2x15  Standing hip abd RTB - x15 bilateral - OOT  Squat - 2x15  +Bridges w pull down RTB x 20  +Rows in slight squat w RTB x 20  +Open books x 20 ea    Chin tucks; 20x w/ 5'hold  Scap retractions in sitting; 20x w/5" hold  Wall posture; 3 x 30"     Maria Esther received the following manual therapy techniques: Manual traction, Myofacial release and Soft tissue Mobilization were applied to the: low back for 0 minutes, including:    Pt was agreeable to dry needling by a certified dry needling PT and signed a consent prior to treatment after being made aware of risks.  · 50mm needles were inserted into lumbar paraspinals and multifidi  · Electrical stimulation was applied to these needles at 2 Hz and a pulse of 250 µseconds for 10 minutes with a [facility] check.   · Afterwards, needles were removed and placed into a sharps container. Pt reported a good response to treatment.     Patient Education and Home Exercises     Home Exercises Provided and Patient Education Provided     Education provided:   - Pt educated on POC  - Pt educated on anatomy and physiology of current conditions as it relates to signs and symptoms  - Pt educated on HEP   - Pt educated extensively on manual therapy vs exercise     Written Home Exercises Provided: yes. 3/14/22. Exercises were reviewed and Maria Esther was able to demonstrate them prior to the end of the session.  Maria Esther demonstrated good  understanding of the education provided. See EMR under Patient Instructions for exercises provided during therapy sessions    ASSESSMENT     Maria Esther was re-assessed today with 4/5 STGs being met indicating improvements in L low back pain, ability to walk long distances, and tolerance for LE strengthening and HEP since start of care. She continues with upper trunk " pain, numbness into B UEs, postural imbalance, UE and LE weakness, and decreased functional mobility. Pt could benefit from continued physical therapy services to address deficits.     She had good tolerance to treatment today with no adverse effects. Post-treatment low back pain rated as 1/10. Pt presents to clinic with reports of B UE numbness with activity. Increase in symptoms with pec stretching, overhead ranges, and thoracic movements. Unclear of origin of symptoms. She continues with increased thoracic kyphosis. Some improvement in numbness with towel roll between scapulae. Will continue to progress LE and UE strengthening to tolerance.     Maria Esther Is progressing well towards her goals.   Pt prognosis is Fair.     Pt will continue to benefit from skilled outpatient physical therapy to address the deficits listed in the problem list box on initial evaluation, provide pt/family education and to maximize pt's level of independence in the home and community environment.     Pt's spiritual, cultural and educational needs considered and pt agreeable to plan of care and goals.     Anticipated barriers to physical therapy: currently having multiple passive tx at other locations    Goals:   Short Term Goals:  6 weeks   1. Report decreased L low back pain  <  / =  5/10 at worst to increase tolerance for sleep.- met 3/14/22  2. Pt will be able to tolerate multi-directional LE strengthening in order to improve ability to perform household chores.- met 3/14/22  3. Pt will increase lumbar flexion AROM to 100% to indicate improved flexibility.- in progress  4. Pt will report 50% improvement in ability to walk long distances since start of care to indicate improved functional mobility.- met 3/14/22   5. Pt to tolerate HEP to improve ROM and independence with ADL's.- met 3/14/22     Long Term Goals: 12 weeks in progress  1. Report decreased L low back pain  <  / =  3/10 at worst to increase tolerance for sleep.  2. Pt will be  able to perform 2 x 10 multi-directional LE strengthening without fatigue in order to improve ability to perform household chores.  3. Pt will increase lumbar R SB AROM to 100% to indicate improved flexibility.   4. Pt will report 80% improvement in ability to walk long distances since start of care to indicate improved functional mobility.   5. Pt to be Independent with HEP to improve ROM and independence with ADL's.    PLAN     Progress UE and LE strengthening.     Nataliya Carrasco, PT, DPT

## 2022-03-14 ENCOUNTER — CLINICAL SUPPORT (OUTPATIENT)
Dept: REHABILITATION | Facility: HOSPITAL | Age: 57
End: 2022-03-14
Attending: FAMILY MEDICINE
Payer: MEDICAID

## 2022-03-14 DIAGNOSIS — M62.81 MUSCLE WEAKNESS OF LOWER EXTREMITY: ICD-10-CM

## 2022-03-14 DIAGNOSIS — M62.89 MUSCLE TIGHTNESS: ICD-10-CM

## 2022-03-14 DIAGNOSIS — M53.86 DECREASED RANGE OF MOTION OF LUMBAR SPINE: Primary | ICD-10-CM

## 2022-03-14 PROCEDURE — 97110 THERAPEUTIC EXERCISES: CPT | Mod: PN

## 2022-03-15 ENCOUNTER — TELEPHONE (OUTPATIENT)
Dept: SPEECH THERAPY | Facility: HOSPITAL | Age: 57
End: 2022-03-15
Payer: MEDICAID

## 2022-03-15 NOTE — PROGRESS NOTES
[facility]   Physical Therapy Treatment Note     Name: Eduarda Casanova  Clinic Number: [id number]    Therapy Diagnosis:   Encounter Diagnoses   Name Primary?    Decreased range of motion of lumbar spine Yes    Muscle weakness of lower extremity     Muscle tightness      Physician: Jesse May III, MD    Visit Date: 3/16/2022     Physician Orders: PT Eval and Treat   Medical Diagnosis from Referral: Other osteoarthritis of spine, lumbar region  Evaluation Date: 2/3/2022  Authorization Period Expiration: 2/26/22  Plan of Care Expiration: 5/3/22  Progress Note Due: 4/14/22  Visit # / Visits authorized: 7/12   FOTO: 5/5 NEXT     Time In: 1047  Time Out: 1139  Total Billable Time: 52 minutes    Precautions: Diabetes     SUBJECTIVE     Pt reports: she currently has numbness into her hands. Pt has not been able to determine pattern to symptoms. She occasionally feels like blood is rushing back down to arm.   She was partially compliant with home exercise program.  Response to previous treatment: good with no soreness  Functional change: none to note    Pain: 2-3/10  Location: midline and L low back       OBJECTIVE     Objective Measures updated at progress report unless specified.     Treatment     Maria Esther received the treatments listed below:      Therapeutic exercises to develop strength, endurance, ROM, flexibility, posture and core stabilization for 42 minutes including tests and measure performed above:    Lumbar flex stretch w ball 10 sec hold x 10  Prone T in corner of mat x 20  Biceps/median nerve glide on wall 5 sec hold x 10 ea  Supine B sh ER at 90 w dowel x 20  Prone on elbows w chin propped on elevated hands x 2 min  Prone cerv ext x 20  Isometric cervical ext and B SB w RTB x 20 ea    Maria Esther received the following manual therapy techniques: for 10 minutes including:    Cervical distraction  B 1st rib mob  STM to B pec and scalenes     Patient Education and Home Exercises      Home Exercises Provided and Patient Education Provided     Education provided:   - Pt educated on POC  - Pt educated on anatomy and physiology of current conditions as it relates to signs and symptoms  - Pt educated on HEP   - Pt educated extensively on manual therapy vs exercise     Written Home Exercises Provided: Patient instructed to cont prior HEP. 3/14/22. Exercises were reviewed and Maria Esther was able to demonstrate them prior to the end of the session.  Maria Esther demonstrated good  understanding of the education provided. See EMR under Patient Instructions for exercises provided during therapy sessions    ASSESSMENT     Maria Esther had good to fair tolerance to treatment today with no adverse effects. Post-treatment back pain rated as 2-3/10. Pt presents to clinic with reports of R UE numbness. Occasional reports of UE going numb with overhead ranges and rush of blood into UEs when placed in dependent position. Presentation consistent with possible thoracic outlet syndrome in conjunction with cervical pathology. Good response to median nerve glides but no resolution of symptoms throughout session today. Will continue to progress LE and UE strengthening to tolerance.     Maria Esther Is progressing well towards her goals.   Pt prognosis is Fair.     Pt will continue to benefit from skilled outpatient physical therapy to address the deficits listed in the problem list box on initial evaluation, provide pt/family education and to maximize pt's level of independence in the home and community environment.     Pt's spiritual, cultural and educational needs considered and pt agreeable to plan of care and goals.     Anticipated barriers to physical therapy: currently having multiple passive tx at other locations    Goals:   Short Term Goals:  6 weeks   1. Report decreased L low back pain  <  / =  5/10 at worst to increase tolerance for sleep.- met 3/14/22  2. Pt will be able to tolerate multi-directional LE strengthening in order to  improve ability to perform household chores.- met 3/14/22  3. Pt will increase lumbar flexion AROM to 100% to indicate improved flexibility.- in progress  4. Pt will report 50% improvement in ability to walk long distances since start of care to indicate improved functional mobility.- met 3/14/22   5. Pt to tolerate HEP to improve ROM and independence with ADL's.- met 3/14/22     Long Term Goals: 12 weeks in progress  1. Report decreased L low back pain  <  / =  3/10 at worst to increase tolerance for sleep.  2. Pt will be able to perform 2 x 10 multi-directional LE strengthening without fatigue in order to improve ability to perform household chores.  3. Pt will increase lumbar R SB AROM to 100% to indicate improved flexibility.   4. Pt will report 80% improvement in ability to walk long distances since start of care to indicate improved functional mobility.   5. Pt to be Independent with HEP to improve ROM and independence with ADL's.    PLAN     Progress UE and LE strengthening.     Nataliya Carrasco, PT, DPT

## 2022-03-16 ENCOUNTER — CLINICAL SUPPORT (OUTPATIENT)
Dept: REHABILITATION | Facility: HOSPITAL | Age: 57
End: 2022-03-16
Attending: FAMILY MEDICINE
Payer: MEDICAID

## 2022-03-16 DIAGNOSIS — M62.89 MUSCLE TIGHTNESS: ICD-10-CM

## 2022-03-16 DIAGNOSIS — M53.86 DECREASED RANGE OF MOTION OF LUMBAR SPINE: Primary | ICD-10-CM

## 2022-03-16 DIAGNOSIS — M62.81 MUSCLE WEAKNESS OF LOWER EXTREMITY: ICD-10-CM

## 2022-03-16 PROCEDURE — 97110 THERAPEUTIC EXERCISES: CPT | Mod: PN

## 2022-03-18 NOTE — PROGRESS NOTES
[facility]   Physical Therapy Treatment Note     Name: Eduarda Casanova  Clinic Number: [id number]    Therapy Diagnosis:   Encounter Diagnoses   Name Primary?    Decreased range of motion of lumbar spine Yes    Muscle weakness of lower extremity     Muscle tightness      Physician: Jesse May III, MD    Visit Date: 3/21/2022     Physician Orders: PT Eval and Treat   Medical Diagnosis from Referral: Other osteoarthritis of spine, lumbar region  Evaluation Date: 2/3/2022  Authorization Period Expiration: 2/26/22  Plan of Care Expiration: 5/3/22  Progress Note Due: 4/14/22  Visit # / Visits authorized: 8/12   FOTO: 5/5 NEXT     Time In: 1515  Time Out: 1608  Total Billable Time: 53 minutes    Precautions: Diabetes     SUBJECTIVE     Pt reports: she currently has numbness into hands and aching in neck with headache. She had some low back pain recently that has now resolved.   She was partially compliant with home exercise program.  Response to previous treatment: good with some aching  Functional change: none to note    Pain: 0/10  Location: neck    OBJECTIVE     Objective Measures updated at progress report unless specified.     Treatment     Maria Esther received the treatments listed below:      Therapeutic exercises to develop strength, endurance, ROM, flexibility, posture and core stabilization for 53 minutes including tests and measure performed above:    Suboccipital self-mob w half roll x 20 ea  Supine L post cap stretch 3 x 30 sec ea  Supine L ulnar nerve glides 5 sec hold x 10  Supine thoracic ext over horiz half roll x 3 min  Open books x 20 ea  Elbe stretch in standing w hands on elevated HOB 5 sec hold x 10  B sh horiz abd w RTB x 20  Standing lat stretch on pole 3 x 30 sec ea  B sh IR w RTB x 20 ea  Standing ulnar nerve glides x 10 ea    Maria Esther received the following manual therapy techniques: for 0 minutes including:    Cervical distraction  B 1st rib mob  STM to B pec and  debi     Patient Education and Home Exercises     Home Exercises Provided and Patient Education Provided     Education provided:   - Pt educated on POC  - Pt educated on anatomy and physiology of current conditions as it relates to signs and symptoms  - Pt educated on HEP   - Pt educated extensively on manual therapy vs exercise     Written Home Exercises Provided: Patient instructed to cont prior HEP. 3/14/22. Exercises were reviewed and Maria Esther was able to demonstrate them prior to the end of the session.  Maria Esther demonstrated good  understanding of the education provided. See EMR under Patient Instructions for exercises provided during therapy sessions    ASSESSMENT     Maria Esther had good to fair tolerance to treatment today with no adverse effects. Post-treatment back pain rated as 0/10 with improved headache. Pt presents to clinic with reports of B UE numbness. She reports anterior L shoulder pain in supine with thoracic extension stretching. Improvement in symptoms and UE numbness with posterior capsule stretching and following crepitus in shoulder. Pt continues with symptoms consistent with TOS. Will continue to progress LE and UE strengthening to tolerance.     Maria Esther Is progressing well towards her goals.   Pt prognosis is Fair.     Pt will continue to benefit from skilled outpatient physical therapy to address the deficits listed in the problem list box on initial evaluation, provide pt/family education and to maximize pt's level of independence in the home and community environment.     Pt's spiritual, cultural and educational needs considered and pt agreeable to plan of care and goals.     Anticipated barriers to physical therapy: currently having multiple passive tx at other locations    Goals:   Short Term Goals:  6 weeks   1. Report decreased L low back pain  <  / =  5/10 at worst to increase tolerance for sleep.- met 3/14/22  2. Pt will be able to tolerate multi-directional LE strengthening in order to  improve ability to perform household chores.- met 3/14/22  3. Pt will increase lumbar flexion AROM to 100% to indicate improved flexibility.- in progress  4. Pt will report 50% improvement in ability to walk long distances since start of care to indicate improved functional mobility.- met 3/14/22   5. Pt to tolerate HEP to improve ROM and independence with ADL's.- met 3/14/22     Long Term Goals: 12 weeks in progress  1. Report decreased L low back pain  <  / =  3/10 at worst to increase tolerance for sleep.  2. Pt will be able to perform 2 x 10 multi-directional LE strengthening without fatigue in order to improve ability to perform household chores.  3. Pt will increase lumbar R SB AROM to 100% to indicate improved flexibility.   4. Pt will report 80% improvement in ability to walk long distances since start of care to indicate improved functional mobility.   5. Pt to be Independent with HEP to improve ROM and independence with ADL's.    PLAN     Progress UE and LE strengthening.     Nataliya Carrasco, PT, DPT

## 2022-03-21 ENCOUNTER — CLINICAL SUPPORT (OUTPATIENT)
Dept: REHABILITATION | Facility: HOSPITAL | Age: 57
End: 2022-03-21
Attending: FAMILY MEDICINE
Payer: MEDICAID

## 2022-03-21 DIAGNOSIS — M62.81 MUSCLE WEAKNESS OF LOWER EXTREMITY: ICD-10-CM

## 2022-03-21 DIAGNOSIS — M62.89 MUSCLE TIGHTNESS: ICD-10-CM

## 2022-03-21 DIAGNOSIS — M53.86 DECREASED RANGE OF MOTION OF LUMBAR SPINE: Primary | ICD-10-CM

## 2022-03-21 PROCEDURE — 97110 THERAPEUTIC EXERCISES: CPT | Mod: PN

## 2022-03-22 NOTE — PROGRESS NOTES
"[facility]   Physical Therapy Treatment Note     Name: Eduarda Casanova  Clinic Number: [id number]    Therapy Diagnosis:   Encounter Diagnoses   Name Primary?    Decreased range of motion of lumbar spine Yes    Muscle weakness of lower extremity     Muscle tightness      Physician: Jesse May III, MD    Visit Date: 3/23/2022     Physician Orders: PT Eval and Treat   Medical Diagnosis from Referral: Other osteoarthritis of spine, lumbar region  Evaluation Date: 2/3/2022  Authorization Period Expiration: 2/26/22  Plan of Care Expiration: 5/3/22  Progress Note Due: 4/14/22  Visit # / Visits authorized: 9/12   FOTO: 5/5 NEXT     Time In: 1048AM   Time Out: 1132AM   Total Billable Time: 44 minutes    Precautions: Diabetes     SUBJECTIVE     Pt reports: Lower back feels better. Neck and shoulders is worse. Her hands and arms keep falling asleep all the time and she has to lower them to get blood flow to them.    She was partially compliant with home exercise program.  Response to previous treatment: No better, no worse.   Functional change: Ongoing     Pain: 3/10  Location: neck    OBJECTIVE     Objective Measures updated at progress report unless specified.     Treatment     Maria Esther received the treatments listed below:      Therapeutic exercises to develop strength, endurance, ROM, flexibility, posture and core stabilization for 44 minutes including tests and measure performed above:      Suboccipital self-mob w half roll x 20 ea - NP   Supine L post cap stretch 3 x 30 sec ea  Supine L ulnar nerve glides 5 sec hold x 10  +1/2 foam pec stretch 3 x 30"  Supine thoracic ext over horiz half roll x 3 min  Open books x 20 ea  [place] stretch in standing w hands on elevated HOB 5 sec hold x 10  B sh horiz abd w RTB x 20  Standing lat stretch on pole 3 x 30 sec ea  B sh IR w RTB x 20 ea  Standing ulnar nerve glides x 10 ea m    Maria Esther received the following manual therapy techniques: for 3 minutes " including:  Suboccipital release  Cervical distraction  B 1st rib mob  STM to B pec and scalenes     Patient Education and Home Exercises     Home Exercises Provided and Patient Education Provided     Education provided:       Written Home Exercises Provided: Patient instructed to cont prior HEP. 3/14/22. Exercises were reviewed and Maria Esther was able to demonstrate them prior to the end of the session.  Maria Esther demonstrated good  understanding of the education provided. See EMR under Patient Instructions for exercises provided during therapy sessions    ASSESSMENT   Maria Esther tolerated session well. She reported numbness and tingling with thoracic extension over foam with arm in flexion that was relieved with arms down. Added 1/2 foam pec strech with arms at side without issue. Continue progressing pt to tolerance to address deficits.      Maria Esther Is progressing well towards her goals.   Pt prognosis is Fair.     Pt will continue to benefit from skilled outpatient physical therapy to address the deficits listed in the problem list box on initial evaluation, provide pt/family education and to maximize pt's level of independence in the home and community environment.     Pt's spiritual, cultural and educational needs considered and pt agreeable to plan of care and goals.     Anticipated barriers to physical therapy: currently having multiple passive tx at other locations    Goals:   Short Term Goals:  6 weeks   1. Report decreased L low back pain  <  / =  5/10 at worst to increase tolerance for sleep.- met 3/14/22  2. Pt will be able to tolerate multi-directional LE strengthening in order to improve ability to perform household chores.- met 3/14/22  3. Pt will increase lumbar flexion AROM to 100% to indicate improved flexibility.- in progress  4. Pt will report 50% improvement in ability to walk long distances since start of care to indicate improved functional mobility.- met 3/14/22   5. Pt to tolerate HEP to improve ROM and  independence with ADL's.- met 3/14/22     Long Term Goals: 12 weeks in progress  1. Report decreased L low back pain  <  / =  3/10 at worst to increase tolerance for sleep.  2. Pt will be able to perform 2 x 10 multi-directional LE strengthening without fatigue in order to improve ability to perform household chores.  3. Pt will increase lumbar R SB AROM to 100% to indicate improved flexibility.   4. Pt will report 80% improvement in ability to walk long distances since start of care to indicate improved functional mobility.   5. Pt to be Independent with HEP to improve ROM and independence with ADL's.    PLAN     Progress UE and LE strengthening.     Zachary Guzman, PTA,   03/23/2022

## 2022-03-23 ENCOUNTER — CLINICAL SUPPORT (OUTPATIENT)
Dept: REHABILITATION | Facility: HOSPITAL | Age: 57
End: 2022-03-23
Attending: FAMILY MEDICINE
Payer: MEDICAID

## 2022-03-23 DIAGNOSIS — M62.81 MUSCLE WEAKNESS OF LOWER EXTREMITY: ICD-10-CM

## 2022-03-23 DIAGNOSIS — M53.86 DECREASED RANGE OF MOTION OF LUMBAR SPINE: Primary | ICD-10-CM

## 2022-03-23 DIAGNOSIS — M62.89 MUSCLE TIGHTNESS: ICD-10-CM

## 2022-03-23 PROCEDURE — 97110 THERAPEUTIC EXERCISES: CPT | Mod: PN,CQ

## 2022-04-11 ENCOUNTER — DOCUMENTATION ONLY (OUTPATIENT)
Dept: REHABILITATION | Facility: HOSPITAL | Age: 57
End: 2022-04-11
Payer: MEDICAID

## 2022-04-11 NOTE — PROGRESS NOTES
Contacted pt regarding missed appointment and she said she did not know she had any appointments scheduled last week. She further states she thought her Doctor had said she cancelled therapy as it was not working. She did say she needed to cancel this weeks appointments and will make it to the 4/19/22.     Zachary Guzman, PTA  04/11/2022

## 2022-04-19 ENCOUNTER — CLINICAL SUPPORT (OUTPATIENT)
Dept: REHABILITATION | Facility: HOSPITAL | Age: 57
End: 2022-04-19
Attending: FAMILY MEDICINE
Payer: MEDICAID

## 2022-04-19 DIAGNOSIS — M62.89 MUSCLE TIGHTNESS: ICD-10-CM

## 2022-04-19 DIAGNOSIS — M62.81 MUSCLE WEAKNESS OF LOWER EXTREMITY: ICD-10-CM

## 2022-04-19 DIAGNOSIS — M53.86 DECREASED RANGE OF MOTION OF LUMBAR SPINE: Primary | ICD-10-CM

## 2022-04-19 PROCEDURE — 97110 THERAPEUTIC EXERCISES: CPT | Mod: PN

## 2022-04-19 NOTE — PROGRESS NOTES
[facility]   Physical Therapy Treatment Note     Name: Eduarda Casanova  Clinic Number: [id number]    Therapy Diagnosis:   Encounter Diagnoses   Name Primary?    Decreased range of motion of lumbar spine Yes    Muscle weakness of lower extremity     Muscle tightness      Physician: Jesse May III, MD    Visit Date: 4/19/2022     Physician Orders: PT Eval and Treat   Medical Diagnosis from Referral: Other osteoarthritis of spine, lumbar region  Evaluation Date: 2/3/2022  Authorization Period Expiration: 2/26/22  Plan of Care Expiration: 5/3/22  Progress Note Due: 5/19/22  Visit # / Visits authorized: 10/12   FOTO: 5/5 NEXT     Time In: 1000  Time Out: 1047  Total Billable Time: 47 minutes    Precautions: Diabetes     SUBJECTIVE     Pt reports: neurology PA told her that they would like to do a nerve conduction/EMG study on her on May 3rd and feels that her symptoms are due to nerve compression in the spine. Her hands are still falling asleep really bad. Sitting up straight with good posture has been giving her relief in symptoms. 2 weeks ago her R knee gave out on her and she had trouble walking and bearing weight on it. She also had difficulty lifting her leg. Went to orthopedist and had MRI on R knee that revealed a torn meniscus. Plans to transition to [facility]. Low back pain is at worst 1-2/10. 100% improvement in ability to walk long distances since start of care in reference to low back pain but is limited due to recent meniscus injury.   She was partially compliant with home exercise program.  Response to previous treatment: no change  Functional change: better management of conditions    Pain: 3/10  Location: neck    OBJECTIVE     Objective Measures updated at progress report unless specified.    Taken 4/19/22:  Lumbar flexion AROM: 100%  Lumbar B SB AROM: 100%     Treatment     Maria Esther received the treatments listed below:      Therapeutic exercises to develop  "strength, endurance, ROM, flexibility, posture and core stabilization for 47 minutes including tests and measure performed above:    Suboccipital self-mob w half roll x 20 ea - NP   Supine L post cap stretch 3 x 30 sec ea  Supine L ulnar nerve glides 5 sec hold x 10  1/2 foam pec stretch 3 x 30"  Supine thoracic ext over horiz half roll x 3 min  +Seated thoracic ext/lat stretch stool roll outs 5 sec hold x 20  Open books x 20 ea  [place] stretch in standing w hands on elevated HOB 5 sec hold x 10  B sh horiz abd w RTB x 20  Standing lat stretch on pole 3 x 30 sec ea  B sh IR w RTB x 20 ea  Standing ulnar nerve glides x 10 ea     Seated lumbar flex w ball 10 x 10 sec  +Standing cat-cow leaning on mat 5 sec hold x 20  +Posture correction drill w back on pole x 10    Maria Esther received the following manual therapy techniques: for 0 minutes including:  Suboccipital release  Cervical distraction  B 1st rib mob  STM to B pec and scalenes     Patient Education and Home Exercises     Home Exercises Provided and Patient Education Provided     Education provided:   - meniscus anatomy    Written Home Exercises Provided: yes. 4/19/22. Exercises were reviewed and Maria Esther was able to demonstrate them prior to the end of the session.  Maria Esther demonstrated good  understanding of the education provided. See EMR under Patient Instructions for exercises provided during therapy sessions    ASSESSMENT     Maria Esther was re-assessed today with 5/5 STGs and 5/5 LTGs being met indicating improvements in low back pain, lumbar AROM, LE strength, ability to walk long distances, and independence with HEP since start of care. She presents with recent diagnosis of R meniscus tear. Pt demonstrates improvement in self management of radicular symptoms into UEs. She plans to transfer care to [facility]. Educated pt on importance of improving posture and continuing to perform HEP following DC. She had good tolerance to treatment today with no adverse " effects. Post-treatment neck pain rated as 2/10. Improvement in thoracic extension mobility noted with therapeutic exercises. Able to decrease excessive thoracic kyphosis. Good response to progression of exercise program. Decrease in UE radicular symptoms with stool roll outs. Updated HEP provided. Pt is appropriate for discharge at this time and plans with further assessment with neurology for radicular symptoms into UEs.     Goals:   Short Term Goals:  6 weeks   1. Report decreased L low back pain  <  / =  5/10 at worst to increase tolerance for sleep.- met 3/14/22  2. Pt will be able to tolerate multi-directional LE strengthening in order to improve ability to perform household chores.- met 3/14/22  3. Pt will increase lumbar flexion AROM to 100% to indicate improved flexibility.- met 4/19/22  4. Pt will report 50% improvement in ability to walk long distances since start of care to indicate improved functional mobility.- met 3/14/22   5. Pt to tolerate HEP to improve ROM and independence with ADL's.- met 3/14/22     Long Term Goals: 12 weeks   1. Report decreased L low back pain  <  / =  3/10 at worst to increase tolerance for sleep.- met 4/19/22  2. Pt will be able to perform 2 x 10 multi-directional LE strengthening without fatigue in order to improve ability to perform household chores.- met 4/19/22  3. Pt will increase lumbar R SB AROM to 100% to indicate improved flexibility.- met 4/19/22   4. Pt will report 80% improvement in ability to walk long distances since start of care to indicate improved functional mobility.- met 4/19/22   5. Pt to be Independent with HEP to improve ROM and independence with ADL's.- met 4/19/22    PLAN     Pt is discharged from physical therapy services.    Nataliya Carrasco, PT  4/19/2022

## 2023-06-12 DIAGNOSIS — Z12.31 ENCOUNTER FOR SCREENING MAMMOGRAM FOR MALIGNANT NEOPLASM OF BREAST: Primary | ICD-10-CM

## 2024-01-22 ENCOUNTER — HOSPITAL ENCOUNTER (OUTPATIENT)
Dept: RADIOLOGY | Facility: HOSPITAL | Age: 59
Discharge: HOME | End: 2024-01-22
Payer: COMMERCIAL

## 2024-01-22 DIAGNOSIS — M54.2 CERVICALGIA: ICD-10-CM

## 2024-01-22 DIAGNOSIS — M54.2 CERVICALGIA: Primary | ICD-10-CM

## 2024-01-22 PROCEDURE — 72040 X-RAY EXAM NECK SPINE 2-3 VW: CPT | Mod: 26,,, | Performed by: RADIOLOGY

## 2024-01-22 PROCEDURE — 72040 X-RAY EXAM NECK SPINE 2-3 VW: CPT | Mod: TC,FY

## 2024-03-26 ENCOUNTER — HOSPITAL ENCOUNTER (OUTPATIENT)
Dept: RADIOLOGY | Facility: HOSPITAL | Age: 59
Discharge: HOME | End: 2024-03-26
Payer: COMMERCIAL

## 2024-03-26 VITALS — BODY MASS INDEX: 37.89 KG/M2 | HEIGHT: 68 IN | WEIGHT: 250 LBS

## 2024-03-26 DIAGNOSIS — Z12.31 ENCOUNTER FOR SCREENING MAMMOGRAM FOR MALIGNANT NEOPLASM OF BREAST: ICD-10-CM

## 2024-03-26 PROCEDURE — 77063 BREAST TOMOSYNTHESIS BI: CPT | Mod: 26,,, | Performed by: RADIOLOGY

## 2024-03-26 PROCEDURE — 77063 BREAST TOMOSYNTHESIS BI: CPT | Mod: TC

## 2024-03-26 PROCEDURE — 77067 SCR MAMMO BI INCL CAD: CPT | Mod: 26,,, | Performed by: RADIOLOGY
